# Patient Record
Sex: FEMALE | Race: OTHER | NOT HISPANIC OR LATINO
[De-identification: names, ages, dates, MRNs, and addresses within clinical notes are randomized per-mention and may not be internally consistent; named-entity substitution may affect disease eponyms.]

---

## 2019-05-06 PROBLEM — Z00.00 ENCOUNTER FOR PREVENTIVE HEALTH EXAMINATION: Status: ACTIVE | Noted: 2019-05-06

## 2019-05-21 ENCOUNTER — APPOINTMENT (OUTPATIENT)
Dept: ORTHOPEDIC SURGERY | Facility: CLINIC | Age: 74
End: 2019-05-21
Payer: MEDICARE

## 2019-05-21 DIAGNOSIS — Z96.653 PRESENCE OF ARTIFICIAL KNEE JOINT, BILATERAL: ICD-10-CM

## 2019-05-21 DIAGNOSIS — Z86.79 PERSONAL HISTORY OF OTHER DISEASES OF THE CIRCULATORY SYSTEM: ICD-10-CM

## 2019-05-21 DIAGNOSIS — M25.561 PAIN IN RIGHT KNEE: ICD-10-CM

## 2019-05-21 DIAGNOSIS — Z86.39 PERSONAL HISTORY OF OTHER ENDOCRINE, NUTRITIONAL AND METABOLIC DISEASE: ICD-10-CM

## 2019-05-21 PROCEDURE — 99204 OFFICE O/P NEW MOD 45 MIN: CPT

## 2019-05-21 PROCEDURE — 73562 X-RAY EXAM OF KNEE 3: CPT | Mod: 50

## 2019-06-03 VITALS — SYSTOLIC BLOOD PRESSURE: 153 MMHG | HEIGHT: 64 IN | DIASTOLIC BLOOD PRESSURE: 98 MMHG | HEART RATE: 66 BPM

## 2019-06-25 PROBLEM — Z86.79 HISTORY OF HYPERTENSION: Status: RESOLVED | Noted: 2019-06-25 | Resolved: 2019-06-25

## 2019-06-25 PROBLEM — Z86.39 HISTORY OF DIABETES MELLITUS: Status: RESOLVED | Noted: 2019-06-25 | Resolved: 2019-06-25

## 2019-06-25 NOTE — PHYSICAL EXAM
[General Appearance - Well-Appearing] : Well appearing [General Appearance - Well Nourished] : well nourished [Oriented To Time, Place, And Person] : Oriented to person, place, and time [Impaired Insight] : Insight and judgment were intact [Affect] : The affect was normal. [Mood] : the mood was normal [Sclera] : the sclera and conjunctiva were normal [Neck Cervical Mass (___cm)] : no neck mass was observed [Heart Rate And Rhythm] : heart rate was normal and rhythm regular [] : No respiratory distress [Abdomen Soft] : Soft [Normal Station and Gait] : gait and station were normal [Tenderness] : tenderness [Incision Healed - Describe:] : Incision is healed ~M [Full ROM Unless otherwise noted:] : Full range of motion unless otherwise noted: [LE  Motor Strength Normal unless otherwise noted:] : 5/5 strength in bilateral lower extemities unless otherwise noted. [Normal] : Sensation intact to light touch. [2+] : right 2+ [FreeTextEntry1] : On exam, her incision is clean dry and intact. She is stable to varus and valgus testing but no instability noted. She has no tenderness over the tibia or femur. His mild tenderness over the patella and medial lateral patella retinaculum. His lack of extension. She has no hypermobility of patella. It is well-seated and tracking. The LEFT he has no problems and has excellent range of motion. Overall range of motion both knees 0-125°. She does have some pain in her lower back going towards her RIGHT gluteal area. She has minimal if any gait changes. [Masses] : no masses [Skin Changes - Describe changes:] : No skin changes noted [Swelling] : no swelling

## 2019-06-25 NOTE — REVIEW OF SYSTEMS
[Joint Pain] : joint pain [Joint Stiffness] : joint stiffness [Nl] : Hematologic/Lymphatic [Feeling Tired] : not feeling tired [Fever] : no fever

## 2019-06-25 NOTE — DATA REVIEWED
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 There is a question of lucency around the RIGHT tibia however this did not look overtly loose. This is very symmetric and seem similar to prior from 4 or 5 years ago.

## 2019-06-25 NOTE — DISCUSSION/SUMMARY
[All Questions Answered] : Patient (and family) had all questions answered to an agreeable level of satisfaction [Interested in Proceeding] : Patient (and family) expressed understanding and interest in proceeding with the plan as outlined [de-identified] : I do not think the patient need any surgery right now.\par \par In physical therapy anti-inflammatories and a brace there have also given her a referral to the spine team. I will see her again in 6 months or as needed. We will keep an eye on the tibia to see if there is any progressive lucency or loosening. The meantime she is only having pain from the patella which may be coming from her back as well.\par \par If imaging was ordered, the patient was told to make an appointment to review findings right after all imaging is completed.\par \par We discussed risks, benefits and alternatives. Rationale of care was reviewed and all questions were answered. Patient (and family) had all questions answered to her degree of the level of satisfaction. Patient (and family) expressed understanding and interest in proceeding with the plan as outlined.\par \par \par \par \par This note was done with a voice recognition transcription software and any typos are related to this rather than medical error.

## 2019-06-25 NOTE — HISTORY OF PRESENT ILLNESS
[3] : currently ~his/her~ pain is 3 out of 10 [Intermit.] : ~He/She~ states the symptoms seem to be intermittent [Joint Movement] : worsened by joint movement [Walking] : worsened by walking [None] : No relieving factors are noted [FreeTextEntry1] : This is a 73-year-old female who I know from years ago. Her place of her knees back in 2012 and 2014. She reports having fallen approximately 8 months or a year ago onto the RIGHT knee and started having some anterior RIGHT knee pain. She also fell approximately one month ago twisting and also with considerable pain. Shows a significant lower back pain with occasional tightness in her glutes and hamstrings. She is able to walk and is not using assistive device.

## 2024-01-03 ENCOUNTER — OFFICE (OUTPATIENT)
Dept: URBAN - METROPOLITAN AREA CLINIC 76 | Facility: CLINIC | Age: 79
Setting detail: OPHTHALMOLOGY
End: 2024-01-03
Payer: MEDICARE

## 2024-01-03 DIAGNOSIS — H25.13: ICD-10-CM

## 2024-01-03 DIAGNOSIS — E11.9: ICD-10-CM

## 2024-01-03 DIAGNOSIS — H52.4: ICD-10-CM

## 2024-01-03 DIAGNOSIS — H35.373: ICD-10-CM

## 2024-01-03 PROCEDURE — 92134 CPTRZ OPH DX IMG PST SGM RTA: CPT | Performed by: OPTOMETRIST

## 2024-01-03 PROCEDURE — 92004 COMPRE OPH EXAM NEW PT 1/>: CPT | Performed by: OPTOMETRIST

## 2024-01-03 PROCEDURE — 92015 DETERMINE REFRACTIVE STATE: CPT | Performed by: OPTOMETRIST

## 2024-01-03 ASSESSMENT — SPHEQUIV_DERIVED
OD_SPHEQUIV: 0.25
OS_SPHEQUIV: 0.5
OD_SPHEQUIV: 0.25
OS_SPHEQUIV: 0.5

## 2024-01-03 ASSESSMENT — REFRACTION_AUTOREFRACTION
OD_AXIS: 108
OD_CYLINDER: -1.50
OS_CYLINDER: -2.00
OS_SPHERE: +1.50
OD_SPHERE: +1.00
OS_AXIS: 64

## 2024-01-03 ASSESSMENT — REFRACTION_MANIFEST
OD_AXIS: 108
OS_VA1: 20/20
OD_CYLINDER: -1.50
OS_SPHERE: +1.50
OS_CYLINDER: -2.00
OS_ADD: +2.75
OD_VA1: 20/20
OS_AXIS: 64
OD_SPHERE: +1.00
OD_ADD: +2.75

## 2024-01-03 ASSESSMENT — REFRACTION_CURRENTRX
OD_OVR_VA: 20/
OS_AXIS: 60
OD_AXIS: 108
OD_CYLINDER: -0.75
OD_ADD: +2.50
OS_CYLINDER: -2.00
OS_SPHERE: +1.50
OS_OVR_VA: 20/
OS_ADD: +2.50
OD_SPHERE: +1.00

## 2024-01-03 ASSESSMENT — CONFRONTATIONAL VISUAL FIELD TEST (CVF)
OS_FINDINGS: FULL
OD_FINDINGS: FULL

## 2024-04-19 ENCOUNTER — INPATIENT (INPATIENT)
Facility: HOSPITAL | Age: 79
LOS: 3 days | Discharge: HOME | DRG: 293 | End: 2024-04-23
Attending: STUDENT IN AN ORGANIZED HEALTH CARE EDUCATION/TRAINING PROGRAM | Admitting: STUDENT IN AN ORGANIZED HEALTH CARE EDUCATION/TRAINING PROGRAM
Payer: MEDICARE

## 2024-04-19 VITALS
TEMPERATURE: 98 F | HEIGHT: 61 IN | RESPIRATION RATE: 18 BRPM | SYSTOLIC BLOOD PRESSURE: 171 MMHG | WEIGHT: 171.08 LBS | HEART RATE: 103 BPM | DIASTOLIC BLOOD PRESSURE: 95 MMHG | OXYGEN SATURATION: 99 %

## 2024-04-19 DIAGNOSIS — I50.9 HEART FAILURE, UNSPECIFIED: ICD-10-CM

## 2024-04-19 LAB
ALBUMIN SERPL ELPH-MCNC: 4.2 G/DL — SIGNIFICANT CHANGE UP (ref 3.5–5.2)
ALP SERPL-CCNC: 84 U/L — SIGNIFICANT CHANGE UP (ref 30–115)
ALT FLD-CCNC: 66 U/L — HIGH (ref 0–41)
ANION GAP SERPL CALC-SCNC: 14 MMOL/L — SIGNIFICANT CHANGE UP (ref 7–14)
AST SERPL-CCNC: 34 U/L — SIGNIFICANT CHANGE UP (ref 0–41)
BASE EXCESS BLDV CALC-SCNC: 1.3 MMOL/L — SIGNIFICANT CHANGE UP (ref -2–3)
BASOPHILS # BLD AUTO: 0.06 K/UL — SIGNIFICANT CHANGE UP (ref 0–0.2)
BASOPHILS NFR BLD AUTO: 1 % — SIGNIFICANT CHANGE UP (ref 0–1)
BILIRUB SERPL-MCNC: 0.7 MG/DL — SIGNIFICANT CHANGE UP (ref 0.2–1.2)
BUN SERPL-MCNC: 22 MG/DL — HIGH (ref 10–20)
CA-I SERPL-SCNC: 1.25 MMOL/L — SIGNIFICANT CHANGE UP (ref 1.15–1.33)
CALCIUM SERPL-MCNC: 9.6 MG/DL — SIGNIFICANT CHANGE UP (ref 8.4–10.5)
CHLORIDE SERPL-SCNC: 106 MMOL/L — SIGNIFICANT CHANGE UP (ref 98–110)
CO2 SERPL-SCNC: 22 MMOL/L — SIGNIFICANT CHANGE UP (ref 17–32)
CREAT SERPL-MCNC: 0.9 MG/DL — SIGNIFICANT CHANGE UP (ref 0.7–1.5)
EGFR: 66 ML/MIN/1.73M2 — SIGNIFICANT CHANGE UP
EOSINOPHIL # BLD AUTO: 0.06 K/UL — SIGNIFICANT CHANGE UP (ref 0–0.7)
EOSINOPHIL NFR BLD AUTO: 1 % — SIGNIFICANT CHANGE UP (ref 0–8)
GAS PNL BLDV: 142 MMOL/L — SIGNIFICANT CHANGE UP (ref 136–145)
GAS PNL BLDV: SIGNIFICANT CHANGE UP
GLUCOSE BLDC GLUCOMTR-MCNC: 139 MG/DL — HIGH (ref 70–99)
GLUCOSE BLDC GLUCOMTR-MCNC: 173 MG/DL — HIGH (ref 70–99)
GLUCOSE SERPL-MCNC: 147 MG/DL — HIGH (ref 70–99)
HCO3 BLDV-SCNC: 28 MMOL/L — SIGNIFICANT CHANGE UP (ref 22–29)
HCT VFR BLD CALC: 32.8 % — LOW (ref 37–47)
HCT VFR BLDA CALC: 32 % — LOW (ref 34.5–46.5)
HGB BLD CALC-MCNC: 10.7 G/DL — LOW (ref 11.7–16.1)
HGB BLD-MCNC: 10.2 G/DL — LOW (ref 12–16)
IMM GRANULOCYTES NFR BLD AUTO: 0.3 % — SIGNIFICANT CHANGE UP (ref 0.1–0.3)
LACTATE BLDV-MCNC: 1.6 MMOL/L — SIGNIFICANT CHANGE UP (ref 0.5–2)
LYMPHOCYTES # BLD AUTO: 2.33 K/UL — SIGNIFICANT CHANGE UP (ref 1.2–3.4)
LYMPHOCYTES # BLD AUTO: 39.6 % — SIGNIFICANT CHANGE UP (ref 20.5–51.1)
MCHC RBC-ENTMCNC: 25.6 PG — LOW (ref 27–31)
MCHC RBC-ENTMCNC: 31.1 G/DL — LOW (ref 32–37)
MCV RBC AUTO: 82.4 FL — SIGNIFICANT CHANGE UP (ref 81–99)
MONOCYTES # BLD AUTO: 0.5 K/UL — SIGNIFICANT CHANGE UP (ref 0.1–0.6)
MONOCYTES NFR BLD AUTO: 8.5 % — SIGNIFICANT CHANGE UP (ref 1.7–9.3)
NEUTROPHILS # BLD AUTO: 2.92 K/UL — SIGNIFICANT CHANGE UP (ref 1.4–6.5)
NEUTROPHILS NFR BLD AUTO: 49.6 % — SIGNIFICANT CHANGE UP (ref 42.2–75.2)
NRBC # BLD: 0 /100 WBCS — SIGNIFICANT CHANGE UP (ref 0–0)
NT-PROBNP SERPL-SCNC: 7199 PG/ML — HIGH (ref 0–300)
PCO2 BLDV: 53 MMHG — HIGH (ref 39–42)
PH BLDV: 7.33 — SIGNIFICANT CHANGE UP (ref 7.32–7.43)
PLATELET # BLD AUTO: 310 K/UL — SIGNIFICANT CHANGE UP (ref 130–400)
PMV BLD: 11 FL — HIGH (ref 7.4–10.4)
PO2 BLDV: 31 MMHG — SIGNIFICANT CHANGE UP (ref 25–45)
POTASSIUM BLDV-SCNC: 4.3 MMOL/L — SIGNIFICANT CHANGE UP (ref 3.5–5.1)
POTASSIUM SERPL-MCNC: 4.7 MMOL/L — SIGNIFICANT CHANGE UP (ref 3.5–5)
POTASSIUM SERPL-SCNC: 4.7 MMOL/L — SIGNIFICANT CHANGE UP (ref 3.5–5)
PROT SERPL-MCNC: 7.1 G/DL — SIGNIFICANT CHANGE UP (ref 6–8)
RBC # BLD: 3.98 M/UL — LOW (ref 4.2–5.4)
RBC # FLD: 15.4 % — HIGH (ref 11.5–14.5)
SAO2 % BLDV: 40.9 % — LOW (ref 67–88)
SODIUM SERPL-SCNC: 142 MMOL/L — SIGNIFICANT CHANGE UP (ref 135–146)
T3 SERPL-MCNC: 120 NG/DL — SIGNIFICANT CHANGE UP (ref 80–200)
TROPONIN T, HIGH SENSITIVITY RESULT: 22 NG/L — HIGH (ref 6–13)
TROPONIN T, HIGH SENSITIVITY RESULT: 23 NG/L — HIGH (ref 6–13)
WBC # BLD: 5.89 K/UL — SIGNIFICANT CHANGE UP (ref 4.8–10.8)
WBC # FLD AUTO: 5.89 K/UL — SIGNIFICANT CHANGE UP (ref 4.8–10.8)

## 2024-04-19 PROCEDURE — 83540 ASSAY OF IRON: CPT

## 2024-04-19 PROCEDURE — 99222 1ST HOSP IP/OBS MODERATE 55: CPT

## 2024-04-19 PROCEDURE — 84439 ASSAY OF FREE THYROXINE: CPT

## 2024-04-19 PROCEDURE — 82728 ASSAY OF FERRITIN: CPT

## 2024-04-19 PROCEDURE — 84443 ASSAY THYROID STIM HORMONE: CPT

## 2024-04-19 PROCEDURE — 93010 ELECTROCARDIOGRAM REPORT: CPT

## 2024-04-19 PROCEDURE — 93306 TTE W/DOPPLER COMPLETE: CPT

## 2024-04-19 PROCEDURE — 99291 CRITICAL CARE FIRST HOUR: CPT

## 2024-04-19 PROCEDURE — 84484 ASSAY OF TROPONIN QUANT: CPT

## 2024-04-19 PROCEDURE — 71045 X-RAY EXAM CHEST 1 VIEW: CPT | Mod: 26

## 2024-04-19 PROCEDURE — 85025 COMPLETE CBC W/AUTO DIFF WBC: CPT

## 2024-04-19 PROCEDURE — 80061 LIPID PANEL: CPT

## 2024-04-19 PROCEDURE — 82962 GLUCOSE BLOOD TEST: CPT

## 2024-04-19 PROCEDURE — 84480 ASSAY TRIIODOTHYRONINE (T3): CPT

## 2024-04-19 PROCEDURE — 85027 COMPLETE CBC AUTOMATED: CPT

## 2024-04-19 PROCEDURE — 83036 HEMOGLOBIN GLYCOSYLATED A1C: CPT

## 2024-04-19 PROCEDURE — 83550 IRON BINDING TEST: CPT

## 2024-04-19 PROCEDURE — 83735 ASSAY OF MAGNESIUM: CPT

## 2024-04-19 PROCEDURE — 86803 HEPATITIS C AB TEST: CPT

## 2024-04-19 PROCEDURE — 80048 BASIC METABOLIC PNL TOTAL CA: CPT

## 2024-04-19 PROCEDURE — 36415 COLL VENOUS BLD VENIPUNCTURE: CPT

## 2024-04-19 PROCEDURE — 83880 ASSAY OF NATRIURETIC PEPTIDE: CPT

## 2024-04-19 PROCEDURE — 94660 CPAP INITIATION&MGMT: CPT

## 2024-04-19 RX ORDER — LOSARTAN POTASSIUM 100 MG/1
100 TABLET, FILM COATED ORAL DAILY
Refills: 0 | Status: DISCONTINUED | OUTPATIENT
Start: 2024-04-19 | End: 2024-04-19

## 2024-04-19 RX ORDER — CARVEDILOL PHOSPHATE 80 MG/1
3.12 CAPSULE, EXTENDED RELEASE ORAL EVERY 12 HOURS
Refills: 0 | Status: DISCONTINUED | OUTPATIENT
Start: 2024-04-19 | End: 2024-04-21

## 2024-04-19 RX ORDER — DEXTROSE 50 % IN WATER 50 %
25 SYRINGE (ML) INTRAVENOUS ONCE
Refills: 0 | Status: DISCONTINUED | OUTPATIENT
Start: 2024-04-19 | End: 2024-04-23

## 2024-04-19 RX ORDER — GLUCAGON INJECTION, SOLUTION 0.5 MG/.1ML
1 INJECTION, SOLUTION SUBCUTANEOUS ONCE
Refills: 0 | Status: DISCONTINUED | OUTPATIENT
Start: 2024-04-19 | End: 2024-04-23

## 2024-04-19 RX ORDER — INFLUENZA VIRUS VACCINE 15; 15; 15; 15 UG/.5ML; UG/.5ML; UG/.5ML; UG/.5ML
0.7 SUSPENSION INTRAMUSCULAR ONCE
Refills: 0 | Status: DISCONTINUED | OUTPATIENT
Start: 2024-04-19 | End: 2024-04-23

## 2024-04-19 RX ORDER — INSULIN LISPRO 100/ML
VIAL (ML) SUBCUTANEOUS
Refills: 0 | Status: DISCONTINUED | OUTPATIENT
Start: 2024-04-19 | End: 2024-04-23

## 2024-04-19 RX ORDER — FUROSEMIDE 40 MG
40 TABLET ORAL ONCE
Refills: 0 | Status: COMPLETED | OUTPATIENT
Start: 2024-04-19 | End: 2024-04-19

## 2024-04-19 RX ORDER — DEXTROSE 50 % IN WATER 50 %
15 SYRINGE (ML) INTRAVENOUS ONCE
Refills: 0 | Status: DISCONTINUED | OUTPATIENT
Start: 2024-04-19 | End: 2024-04-23

## 2024-04-19 RX ORDER — SODIUM CHLORIDE 9 MG/ML
1000 INJECTION, SOLUTION INTRAVENOUS
Refills: 0 | Status: DISCONTINUED | OUTPATIENT
Start: 2024-04-19 | End: 2024-04-23

## 2024-04-19 RX ORDER — DEXTROSE 50 % IN WATER 50 %
12.5 SYRINGE (ML) INTRAVENOUS ONCE
Refills: 0 | Status: DISCONTINUED | OUTPATIENT
Start: 2024-04-19 | End: 2024-04-23

## 2024-04-19 RX ORDER — INSULIN LISPRO 100/ML
VIAL (ML) SUBCUTANEOUS AT BEDTIME
Refills: 0 | Status: DISCONTINUED | OUTPATIENT
Start: 2024-04-19 | End: 2024-04-23

## 2024-04-19 RX ORDER — DAPAGLIFLOZIN 10 MG/1
10 TABLET, FILM COATED ORAL EVERY 24 HOURS
Refills: 0 | Status: DISCONTINUED | OUTPATIENT
Start: 2024-04-20 | End: 2024-04-23

## 2024-04-19 RX ORDER — FUROSEMIDE 40 MG
40 TABLET ORAL
Refills: 0 | Status: DISCONTINUED | OUTPATIENT
Start: 2024-04-19 | End: 2024-04-21

## 2024-04-19 RX ORDER — MAGNESIUM SULFATE 500 MG/ML
2 VIAL (ML) INJECTION ONCE
Refills: 0 | Status: COMPLETED | OUTPATIENT
Start: 2024-04-19 | End: 2024-04-19

## 2024-04-19 RX ORDER — ENOXAPARIN SODIUM 100 MG/ML
40 INJECTION SUBCUTANEOUS EVERY 24 HOURS
Refills: 0 | Status: DISCONTINUED | OUTPATIENT
Start: 2024-04-19 | End: 2024-04-23

## 2024-04-19 RX ORDER — METFORMIN HYDROCHLORIDE 850 MG/1
1 TABLET ORAL
Refills: 0 | DISCHARGE

## 2024-04-19 RX ORDER — SACUBITRIL AND VALSARTAN 24; 26 MG/1; MG/1
1 TABLET, FILM COATED ORAL
Refills: 0 | Status: DISCONTINUED | OUTPATIENT
Start: 2024-04-19 | End: 2024-04-23

## 2024-04-19 RX ORDER — DEXTROSE 10 % IN WATER 10 %
125 INTRAVENOUS SOLUTION INTRAVENOUS ONCE
Refills: 0 | Status: DISCONTINUED | OUTPATIENT
Start: 2024-04-19 | End: 2024-04-23

## 2024-04-19 RX ADMIN — CARVEDILOL PHOSPHATE 3.12 MILLIGRAM(S): 80 CAPSULE, EXTENDED RELEASE ORAL at 17:12

## 2024-04-19 RX ADMIN — Medication 40 MILLIGRAM(S): at 11:40

## 2024-04-19 RX ADMIN — Medication 0: at 21:30

## 2024-04-19 RX ADMIN — Medication 40 MILLIGRAM(S): at 17:12

## 2024-04-19 RX ADMIN — ENOXAPARIN SODIUM 40 MILLIGRAM(S): 100 INJECTION SUBCUTANEOUS at 17:12

## 2024-04-19 RX ADMIN — Medication 25 GRAM(S): at 11:40

## 2024-04-19 NOTE — H&P ADULT - NS ATTEND AMEND GEN_ALL_CORE FT
78yoF with HTN, DM, and LEIGH who presented with progressive SOB and edema. CXR with bilateral opacities. ECG with ST and LBBB. BNP 7199. Admitted for volume overload and started on Lasix 40 mg IV BID. No prior history of HF, echo pending.

## 2024-04-19 NOTE — H&P ADULT - ASSESSMENT
Assessment:  79 yo female with PMHx of HTN, DM, LEIGH (not on CPap) who presented to [facility] ED w/ complaints of SOB worsening over past couple of days, found to be volume overloaded, now admitted to cardiac telemetry for further management.     Problems discussed and associated plan:    #SOB/ Volume overload  PRO BnP 7199  -Admit to cardiac telemetry  -Monitor on tele  -ECG in AM  -Troponin 22, repeat pending  -f/u TTE   -f/u AM Labs (Iron total, TIBC, Ferritin, Lipid profile, A1C, TSH/FT4, BMP, CBC, magnesium  -s/p IV Lasix 40mg x 1  -will start Lasix 40mg IVP BID  -Strict I&Os, daily standing weight  -Monitor Lytes, keep K>4, Mg>2, replete as needed    #HTN   -monitor BP   -continue home Losartan 100mg QD  -continue home Coreg 3.125mg BID  -hold home HCTZ i/so IV diuresis     #DM  -f/u AM A1C  -on Metformin at home, will hold inpatient  -ISS/ Finger sticks     #LEIGH  not on cPAP at home  -was put on cpap in ED for comfort, will ween off on unit and keep on NC     Please contact me with any questions or concerns at [phone] Assessment:  79 yo female with PMHx of HTN, DM, LEIGH (not on CPap) who presented to [facility] ED w/ complaints of SOB worsening over past couple of days, found to be volume overloaded, now admitted to cardiac telemetry for further management.     Problems discussed and associated plan:    #SOB/ Volume overload  PRO BnP 7199  -Admit to cardiac telemetry  -Monitor on tele  -ECG in AM  -Troponin 22 -> 23, f/u AM trop  -f/u TTE   -f/u AM Labs (Iron total, TIBC, Ferritin, Lipid profile, A1C, TSH/FT4, BMP, CBC, magnesium  -s/p IV Lasix 40mg x 1  -will start Lasix 40mg IVP BID  -Strict I&Os, daily standing weight  -Monitor Lytes, keep K>4, Mg>2, replete as needed    #HTN   -monitor BP   -continue home Losartan 100mg QD  -continue home Coreg 3.125mg BID  -hold home HCTZ i/so IV diuresis     #DM  -f/u AM A1C  -on Metformin at home, will hold inpatient  -ISS/ Finger sticks     #LEIGH  not on cPAP at home  -was put on cpap in ED for comfort, will ween off on unit and keep on NC     Please contact me with any questions or concerns at [phone]

## 2024-04-19 NOTE — PATIENT PROFILE ADULT - FALL HARM RISK - RISK INTERVENTIONS

## 2024-04-19 NOTE — ED PROVIDER NOTE - PHYSICAL EXAMINATION
CONSTITUTIONAL: well-appearing, in NAD  SKIN: Warm dry, normal skin turgor  HEAD: NCAT  EYES: EOMI, PERRLA, no scleral icterus, conjunctiva pink  ENT: normal pharynx with no erythema or exudates  NECK: Supple; non tender. Full ROM.  CARD: RRR, no murmurs.  RESP: bilateral crackles at the bases  ABD: soft, non-tender, non-distended, no rebound or guarding.  EXT: Full ROM, no bony tenderness, 2+ pitting edema, no calf tenderness  NEURO: normal motor. normal sensory. CN II-XII intact. Cerebellar testing normal. Normal gait.  PSYCH: Cooperative, appropriate.

## 2024-04-19 NOTE — ED PROVIDER NOTE - OBJECTIVE STATEMENT
Patient is a 78 y.o female with PMHx of HTN, DM, LEIGH who presents for eval of SOB worsening over past couple of days. Otherwise denies any fever, chills, headache, changes in vision, cough, congestion, sob, n/v/d, abd pain, constipation, urinary complaints, lower extremity pain/swelling.

## 2024-04-19 NOTE — ED PROVIDER NOTE - CARE PLAN
Principal Discharge DX:	New onset of congestive heart failure  Secondary Diagnosis:	Shortness of breath   1

## 2024-04-19 NOTE — H&P ADULT - NSICDXPASTMEDICALHX_GEN_ALL_CORE_FT
PAST MEDICAL HISTORY:  DM (diabetes mellitus)     Hypertension     LEIGH (obstructive sleep apnea)

## 2024-04-19 NOTE — ED ADULT NURSE NOTE - OBJECTIVE STATEMENT
Pt reports to ed for chest pain & sob since yesterday. Pt told by cardiologist that she has fluid around her lungs.

## 2024-04-19 NOTE — ED ADULT NURSE NOTE - NSFALLUNIVINTERV_ED_ALL_ED
Bed/Stretcher in lowest position, wheels locked, appropriate side rails in place/Call bell, personal items and telephone in reach/Instruct patient to call for assistance before getting out of bed/chair/stretcher/Non-slip footwear applied when patient is off stretcher/[place] to call system/Physically safe environment - no spills, clutter or unnecessary equipment/Purposeful proactive rounding/Room/bathroom lighting operational, light cord in reach

## 2024-04-19 NOTE — ED PROVIDER NOTE - PROGRESS NOTE DETAILS
78-year-old female history of hypertension diabetes coming in here for worsening shortness of breath.  Being evaluated for CHF exacerbation at [place].  Coming here for worsening shortness of breath/chest pain.  Vital signs reviewed.  Patient in mild acute distress.  Tachypneic with expiratory wheezing.  Placed on BiPAP and Lasix given.  EKG chest x-ray done.  Consistent with fluid overload.  Patient reassessed feeling better.  Admission to telemetry.  Patient aware of findings.-Authored by Fior Wynn ED Attending KATHY Snider  cardio tele made aware of pt, report admission to cardio tele, I have fully discussed the medical management and delivery of care with the patient and daughter. I have discussed any available labs, imaging and treatment options.  Pt admitted for further care & management. 78-year-old female history of hypertension diabetes coming in here for worsening shortness of breath.  Being evaluated for CHF exacerbation at [place].  Coming here for worsening shortness of breath/chest pain.  Vital signs reviewed.  Patient in mild acute distress.  Tachypneic with expiratory wheezing.  Placed on BiPAP and Lasix given.  EKG chest x-ray done.  Consistent with fluid overload.  Patient reassessed feeling better.  Admission to telemetry. Patient aware of findings.-Authored by Fior Wynn

## 2024-04-19 NOTE — ED PROVIDER NOTE - CLINICAL SUMMARY MEDICAL DECISION MAKING FREE TEXT BOX
78-year-old female with past medical history of high blood pressure, diabetes, recently seen by pulmonary to rule out obstructive sleep apnea for shortness of breath she was experiencing, had CT chest done on March 27 which showed bilateral pleural effusions, was sent to cardiology for concern for congestive heart failure has not been able to get an appointment (sees physician in [place]), today went to [place] to get a CT scan of her back that was completed for chronic back pain after getting the CT scan patient had worsening shortness of breath that she has been having for 2 days, worse with exertion, associated with substernal chest tightness that started last night, constant, mild, nonradiating, no alleviating or precipitating factors, as well as nausea. No fever, chills, n/v, pleuritic cp,  alpitations, diaphoresis, cough, ha/lh/dizziness, numbness/tingling, neck pain/ stiffness, abd pain, diarrhea, constipation, melena/brbpr, urinary symptoms, trauma, weakness, calf pain/swelling/erythema, sick contacts, recent travel or rash.    On Exam:  Vital Signs: I have reviewed the initial vital signs. Constitutiona pt sitting on stretcher speaking full sentences but with increased WOB improved on BiPAP. Integumentary: No rash. ENT: MMM NECK: Supple, non-tender, no meningeal signs. Cardiovascular: RRR, radial pulses 2/4 b/l. No JVD. Respiratory: BS present b/l, crackles present b/l, poor air exchange, (+) accessory muscle use improved on BiPAP, no stridor. bedside us with b/l pleural effusions and b lines. Gastrointestinal: BS present throughout all 4 quadrants, soft, nd, nt, no rebound tenderness or guarding, no cvat. Musculoskeletal: FROM, b/l edema, no calf pain/swelling/erythema. Neurologic: AAOx3, motor 5/5 and sensation intact throughout upper and lowe ext, CN II-XII intact, No facial droop or slurring of speech. No focal deficits.    Plan: Monitor, BiPAP, EKG, CXR, labs, reassess.    Labs and EKG were ordered and reviewed.  Imaging was ordered and reviewed by me.  Appropriate medications for patient's presenting complaints were ordered and effects were reassessed. Additional history was obtained from daughter. 78-year-old female with past medical history of high blood pressure, diabetes, recently seen by pulmonary to rule out obstructive sleep apnea for shortness of breath she was experiencing, had CT chest done on March 27 which showed bilateral pleural effusions, was sent to cardiology for concern for congestive heart failure has not been able to get an appointment (sees physician in [place]), today went to [place] to get a CT scan of her back that was completed for chronic back pain after getting the CT scan patient had worsening shortness of breath that she has been having for 2 days, worse with exertion, associated with substernal chest tightness that started last night, constant, mild, nonradiating, no alleviating or precipitating factors, as well as nausea. No fever, chills, n/v, pleuritic cp,  alpitations, diaphoresis, cough, ha/lh/dizziness, numbness/tingling, neck pain/ stiffness, abd pain, diarrhea, constipation, melena/brbpr, urinary symptoms, trauma, weakness, calf pain/swelling/erythema, sick contacts, recent travel or rash.    On Exam:  Vital Signs: I have reviewed the initial vital signs. Constitutiona pt sitting on stretcher speaking full sentences but with increased WOB improved on BiPAP. Integumentary: No rash. ENT: MMM NECK: Supple, non-tender, no meningeal signs. Cardiovascular: RRR, radial pulses 2/4 b/l. No JVD. Respiratory: BS present b/l, crackles present b/l, poor air exchange, (+) accessory muscle use improved on BiPAP, no stridor. bedside us with b/l pleural effusions and b lines. Gastrointestinal: BS present throughout all 4 quadrants, soft, nd, nt, no rebound tenderness or guarding, no cvat. Musculoskeletal: FROM, b/l edema, no calf pain/swelling/erythema. Neurologic: AAOx3, motor 5/5 and sensation intact throughout upper and lowe ext, CN II-XII intact, No facial droop or slurring of speech. No focal deficits.    Plan: Monitor, BiPAP, EKG, CXR, labs, reassess.    Labs and EKG were ordered and reviewed.  Imaging was ordered and reviewed by me.  Appropriate medications for patient's presenting complaints were ordered and effects were reassessed. Additional history was obtained from daughter. Escalation to admission/observation was considered. Patient requires inpatient hospitalization - monitored setting.  cardio tele made aware of pt, report admission to cardio tele, I have fully discussed the medical management and delivery of care with the patient and daughter. I have discussed any available labs, imaging and treatment options.  Pt admitted for further care & management. 78-year-old female with past medical history of high blood pressure, diabetes, recently seen by pulmonary to rule out obstructive sleep apnea for shortness of breath she was experiencing, had CT chest done on March 27 which showed bilateral pleural effusions, was sent to cardiology for concern for congestive heart failure has not been able to get an appointment (sees physician in [place]), today went to [place] to get a CT scan of her back that was completed for chronic back pain after getting the CT scan patient had worsening shortness of breath that she has been having for 2 days, worse with exertion, associated with substernal chest tightness that started last night, constant, mild, nonradiating, no alleviating or precipitating factors, as well as nausea. No fever, chills, n/v, pleuritic cp,  palpitations, diaphoresis, cough, ha/lh/dizziness, numbness/tingling, neck pain/ stiffness, abd pain, diarrhea, constipation, melena/brbpr, urinary symptoms, trauma, weakness, calf pain/swelling/erythema, sick contacts, recent travel or rash.    On Exam:  Vital Signs: I have reviewed the initial vital signs. Constitutional: pt sitting on stretcher speaking full sentences but with increased WOB improved on BiPAP. Integumentary: No rash. ENT: MMM NECK: Supple, non-tender, no meningeal signs. Cardiovascular: RRR, radial pulses 2/4 b/l. No JVD. Respiratory: BS present b/l, crackles present b/l, poor air exchange, (+) accessory muscle use improved on BiPAP, no stridor. bedside us with b/l pleural effusions and b lines. Gastrointestinal: BS present throughout all 4 quadrants, soft, nd, nt, no rebound tenderness or guarding, no cvat. Musculoskeletal: FROM, b/l edema, no calf pain/swelling/erythema. Neurologic: AAOx3, motor 5/5 and sensation intact throughout upper and lowe ext, CN II-XII intact, No facial droop or slurring of speech. No focal deficits.    Plan: Monitor, BiPAP, EKG, CXR, labs, reassess.    Labs and EKG were ordered and reviewed.  Imaging was ordered and reviewed by me.  Appropriate medications for patient's presenting complaints were ordered and effects were reassessed. Additional history was obtained from daughter. Escalation to admission/observation was considered. Patient requires inpatient hospitalization - monitored setting.  cardio tele made aware of pt, report admission to cardio tele, I have fully discussed the medical management and delivery of care with the patient and daughter. I have discussed any available labs, imaging and treatment options.  Pt admitted for further care & management.

## 2024-04-19 NOTE — ED ADULT TRIAGE NOTE - CHIEF COMPLAINT QUOTE
as per daughter  told her to go to cardiologist for fluid around her lungs. yesterday she started having shortness of breath with chest pain

## 2024-04-19 NOTE — H&P ADULT - NSHPPHYSICALEXAM_GEN_ALL_CORE
General: No apparent distress, on bipap for comfort   HEENT: Anicteric sclera. Moist mucous membranes.   Cardiac: Regular rate and rhythm. No murmurs, rubs, or gallops.   Vascular: Symmetric radial pulses. Dorsalis pedis pulses palpable.   Respiratory: Normal effort. Bibasilar crackles.    Abdomen: Soft, nontender. Audible bowel sounds.   Extremities: Warm with 2+ pitting edema. No cyanosis or clubbing.   Skin: Warm and dry. No rash.   Neurologic: Grossly normal motor function.   Psychiatric: Euthymic. Oriented to person, place, and time.

## 2024-04-19 NOTE — ED PROVIDER NOTE - ATTENDING CONTRIBUTION TO CARE
78-year-old female with past medical history of high blood pressure, diabetes, recently seen by pulmonary to rule out obstructive sleep apnea for shortness of breath she was experiencing, had CT chest done on March 27 which showed bilateral pleural effusions, was sent to cardiology for concern for congestive heart failure has not been able to get an appointment (sees physician in [place]), today went to [place] to get a CT scan of her back that was completed for chronic back pain after getting the CT scan patient had worsening shortness of breath that she has been having for 2 days, worse with exertion, associated with substernal chest tightness that started last night, constant, mild, nonradiating, no alleviating or precipitating factors, as well as nausea. No fever, chills, n/v, pleuritic cp,  alpitations, diaphoresis, cough, ha/lh/dizziness, numbness/tingling, neck pain/ stiffness, abd pain, diarrhea, constipation, melena/brbpr, urinary symptoms, trauma, weakness, calf pain/swelling/erythema, sick contacts, recent travel or rash.    On Exam:  Vital Signs: I have reviewed the initial vital signs. Constitutiona pt sitting on stretcher speaking full sentences but with increased WOB improved on BiPAP. Integumentary: No rash. ENT: MMM NECK: Supple, non-tender, no meningeal signs. Cardiovascular: RRR, radial pulses 2/4 b/l. No JVD. Respiratory: BS present b/l, crackles present b/l, poor air exchange, (+) accessory muscle use improved on BiPAP, no stridor. bedside us with b/l pleural effusions and b lines. Gastrointestinal: BS present throughout all 4 quadrants, soft, nd, nt, no rebound tenderness or guarding, no cvat. Musculoskeletal: FROM, b/l edema, no calf pain/swelling/erythema. Neurologic: AAOx3, motor 5/5 and sensation intact throughout upper and lowe ext, CN II-XII intact, No facial droop or slurring of speech. No focal deficits.    Plan: Monitor, BiPAP, EKG, CXR, labs, reassess. 78-year-old female with past medical history of high blood pressure, diabetes, recently seen by pulmonary to rule out obstructive sleep apnea for shortness of breath she was experiencing, had CT chest done on March 27 which showed bilateral pleural effusions, was sent to cardiology for concern for congestive heart failure has not been able to get an appointment (sees physician in [place]), today went to [place] to get a CT scan of her back that was completed for chronic back pain after getting the CT scan patient had worsening shortness of breath that she has been having for 2 days, worse with exertion, associated with substernal chest tightness that started last night, constant, mild, nonradiating, no alleviating or precipitating factors, as well as nausea. No fever, chills, n/v, pleuritic cp,  palpitations, diaphoresis, cough, ha/lh/dizziness, numbness/tingling, neck pain/ stiffness, abd pain, diarrhea, constipation, melena/brbpr, urinary symptoms, trauma, weakness, calf pain/swelling/erythema, sick contacts, recent travel or rash.    On Exam:  Vital Signs: I have reviewed the initial vital signs. Constitutional: pt sitting on stretcher speaking full sentences but with increased WOB improved on BiPAP. Integumentary: No rash. ENT: MMM NECK: Supple, non-tender, no meningeal signs. Cardiovascular: RRR, radial pulses 2/4 b/l. No JVD. Respiratory: BS present b/l, crackles present b/l, poor air exchange, (+) accessory muscle use improved on BiPAP, no stridor. bedside us with b/l pleural effusions and b lines. Gastrointestinal: BS present throughout all 4 quadrants, soft, nd, nt, no rebound tenderness or guarding, no cvat. Musculoskeletal: FROM, b/l edema, no calf pain/swelling/erythema. Neurologic: AAOx3, motor 5/5 and sensation intact throughout upper and lowe ext, CN II-XII intact, No facial droop or slurring of speech. No focal deficits.    Plan: Monitor, BiPAP, EKG, CXR, labs, reassess.

## 2024-04-19 NOTE — H&P ADULT - NSHPLABSRESULTS_GEN_ALL_CORE
- ECG (4/19/2024):  Sinus Tachycardia 103 bpm, LBBB   - CXR (4/19/2024): PENDING     - Labs:                        10.2   5.89  )-----------( 310      ( 19 Apr 2024 11:50 )             32.8     04-19    142  |  106  |  22<H>  ----------------------------<  147<H>  4.7   |  22  |  0.9    Ca    9.6      19 Apr 2024 11:50    TPro  7.1  /  Alb  4.2  /  TBili  0.7  /  DBili  x   /  AST  34  /  ALT  66<H>  /  AlkPhos  84  04-19    LIVER FUNCTIONS - ( 19 Apr 2024 11:50 )  Alb: 4.2 g/dL / Pro: 7.1 g/dL / ALK PHOS: 84 U/L / ALT: 66 U/L / AST: 34 U/L / GGT: x           Lactate Trend    Urinalysis Basic - ( 19 Apr 2024 11:50 )    Color: x / Appearance: x / SG: x / pH: x  Gluc: 147 mg/dL / Ketone: x  / Bili: x / Urobili: x   Blood: x / Protein: x / Nitrite: x   Leuk Esterase: x / RBC: x / WBC x   Sq Epi: x / Non Sq Epi: x / Bacteria: x

## 2024-04-19 NOTE — H&P ADULT - HISTORY OF PRESENT ILLNESS
Mrs. Angeline Vasquez is 79 yo female with PMHx of HTN, DM, LEIGH (not on CPap) who presented to [facility] ED w/ complaints of SOB worsening over past couple of days. Patients daughter Shabnam at bedside at time of interview. Patient reports she has been progressively SOB, worsening over past 2 days, daughter states patient was initially able to walk around the house now reporting significant SOB with couple of steps. Patient endorses pleuritic chest pain on exertion but comfortable at rest. Patient was seen by a pulmonologist in [place] who endorsed patient has significant LEIGH, was recommended to start CPap but has not be able to get an appointment. Patient has a cardiologist in [place] whom she reports has not been able to get an appointment with. Patient endorses she sleeps with 3-4 pillows, and has noticed LE swelling since onset of symptoms. Daughter reports she is on HCTZ at home but endorses some med non-compliance as hydrochlorthiazide makes her feel dizzy. Patient endorses orthopnea and PND. Of note, patient denies active chest pain, syncope, dizziness, recent illness, sick contacts or recent travel.     In the ED patients VS notable for /95  RR 18 O2 99% on room air, pt transitioned to biPap for comfort while in stretcher.   CXR showed  EKG showed Sinus Tachycardia 103 bpm, LBBB   Labs notable for Hgb 10.2, ALT 66, BNP 7199, trop 22 -> trend pending  In the ED patient received Lasix 40mg IVP x 1 & Magnesium 2gm IVPB    Patient is now admitted to cardiac telemetry for further evaluation.  Mrs. Angeline Vasquez is 77 yo female with PMHx of HTN, DM, LEIGH (not on CPap) who presented to [facility] ED w/ complaints of SOB worsening over past couple of days. Patients daughter Shabnam at bedside at time of interview. Patient reports she has been progressively SOB, worsening over past 2 days, daughter states patient was initially able to walk around the house now reporting significant SOB with couple of steps. Patient endorses pleuritic chest pain on exertion but comfortable at rest. Patient was seen by a pulmonologist in [place] who endorsed patient has significant LEIGH, was recommended to start CPap but has not be able to get an appointment. Patient has a cardiologist in [place] whom she reports has not been able to get an appointment with. Patient endorses she sleeps with 3-4 pillows, and has noticed LE swelling since onset of symptoms. Daughter reports she is on HCTZ at home but endorses some med non-compliance as hydrochlorthiazide makes her feel dizzy. Patient endorses orthopnea and PND. Of note, patient denies active chest pain, syncope, dizziness, recent illness, sick contacts or recent travel.     In the ED patients VS notable for /95  RR 18 O2 99% on room air, pt transitioned to biPap for comfort while in stretcher.   CXR showed Enlarged cardiac silhouette, interstitial opacities, and trace effusions.  EKG showed Sinus Tachycardia 103 bpm, LBBB   Labs notable for Hgb 10.2, ALT 66, BNP 7199, trop 22 -> trend pending  In the ED patient received Lasix 40mg IVP x 1 & Magnesium 2gm IVPB    Patient is now admitted to cardiac telemetry for further management.  Mrs. Angeline Vasquez is 77 yo female with PMHx of HTN, DM, LEIGH (not on CPap) who presented to [facility] ED w/ complaints of SOB worsening over past couple of days. Patients daughter Shabnam at bedside at time of interview. Patient reports she has been progressively SOB, worsening over past 2 days, daughter states patient was initially able to walk around the house now reporting significant SOB with couple of steps. Patient endorses pleuritic chest pain on exertion but comfortable at rest. Patient was seen by a pulmonologist in [place] who endorsed patient has significant LEIGH, was recommended to start CPap but has not be able to get an appointment. Patient has a cardiologist in [place] whom she reports has not been able to get an appointment with. Patient endorses she sleeps with 3-4 pillows, and has noticed LE swelling since onset of symptoms. Daughter reports she is on HCTZ at home but endorses some med non-compliance as hydrochlorthiazide makes her feel dizzy. Patient endorses orthopnea and PND. Of note, patient denies active chest pain, syncope, dizziness, recent illness, sick contacts or recent travel.     In the ED patients VS notable for /95  RR 18 O2 99% on room air, pt transitioned to biPap for comfort while in stretcher.   CXR showed Enlarged cardiac silhouette, interstitial opacities, and trace effusions.  EKG showed Sinus Tachycardia 103 bpm, LBBB   Labs notable for Hgb 10.2, ALT 66, BNP 7199, trop 22 -> 23   In the ED patient received Lasix 40mg IVP x 1 & Magnesium 2gm IVPB    Patient is now admitted to cardiac telemetry for further management.  Mrs. Avani Vasquez is 79 yo female with PMHx of HTN, DM, LEIGH (not on CPap) who presented to [facility] ED w/ complaints of SOB worsening over past couple of days. Patients daughter Shabnam at bedside at time of interview. Patient reports she has been progressively SOB, worsening over past 2 days, daughter states patient was initially able to walk around the house now reporting significant SOB with couple of steps. Patient endorses pleuritic chest pain on exertion but comfortable at rest. Patient was seen by a pulmonologist in [place] who endorsed patient has significant LEIGH, was recommended to start CPap but has not be able to get an appointment. Patient has a cardiologist in [place] whom she reports has not been able to get an appointment with. Patient endorses she sleeps with 3-4 pillows, and has noticed LE swelling since onset of symptoms. Daughter reports she is on HCTZ at home but endorses some med non-compliance as hydrochlorthiazide makes her feel dizzy. Patient endorses orthopnea and PND. Of note, patient denies active chest pain, syncope, dizziness, recent illness, sick contacts or recent travel.     In the ED patients VS notable for /95  RR 18 O2 99% on room air, pt transitioned to biPap for comfort while in stretcher.   CXR showed Enlarged cardiac silhouette, interstitial opacities, and trace effusions.  EKG showed Sinus Tachycardia 103 bpm, LBBB   Labs notable for Hgb 10.2, ALT 66, BNP 7199, trop 22 -> 23   In the ED patient received Lasix 40mg IVP x 1 & Magnesium 2gm IVPB    Patient is now admitted to cardiac telemetry for further management.

## 2024-04-19 NOTE — PATIENT PROFILE ADULT - INTERNATIONAL TRAVEL
"Anesthesia Release from PACU Note    Patient: Dulce Boogie    Procedure(s) Performed: Procedure(s) (LRB):  COLONOSCOPY (N/A)    Anesthesia type: MAC    Post pain: Adequate analgesia    Post assessment: no apparent anesthetic complications, tolerated procedure well and no evidence of recall    Last Vitals:   Visit Vitals  /75 (BP Location: Left arm, Patient Position: Lying)   Pulse 71   Temp 36.1 °C (97 °F) (Temporal)   Resp 17   Ht 5' 5" (1.651 m)   Wt 95.2 kg (209 lb 14.1 oz)   LMP 06/21/2003   SpO2 97%   Breastfeeding? No   BMI 34.93 kg/m²       Post vital signs: stable    Level of consciousness: awake, alert  and oriented    Nausea/Vomiting: no nausea/no vomiting    Complications: none    Airway Patency: patent    Respiratory: unassisted, spontaneous ventilation, room air    Cardiovascular: stable and blood pressure at baseline    Hydration: euvolemic  " No

## 2024-04-20 ENCOUNTER — RESULT REVIEW (OUTPATIENT)
Age: 79
End: 2024-04-20

## 2024-04-20 LAB
A1C WITH ESTIMATED AVERAGE GLUCOSE RESULT: 6.7 % — HIGH (ref 4–5.6)
ANION GAP SERPL CALC-SCNC: 12 MMOL/L — SIGNIFICANT CHANGE UP (ref 7–14)
BUN SERPL-MCNC: 23 MG/DL — HIGH (ref 10–20)
CALCIUM SERPL-MCNC: 9.5 MG/DL — SIGNIFICANT CHANGE UP (ref 8.4–10.5)
CHLORIDE SERPL-SCNC: 104 MMOL/L — SIGNIFICANT CHANGE UP (ref 98–110)
CHOLEST SERPL-MCNC: 198 MG/DL — SIGNIFICANT CHANGE UP
CO2 SERPL-SCNC: 30 MMOL/L — SIGNIFICANT CHANGE UP (ref 17–32)
CREAT SERPL-MCNC: 1.2 MG/DL — SIGNIFICANT CHANGE UP (ref 0.7–1.5)
EGFR: 47 ML/MIN/1.73M2 — LOW
ESTIMATED AVERAGE GLUCOSE: 146 MG/DL — HIGH (ref 68–114)
FERRITIN SERPL-MCNC: 42 NG/ML — SIGNIFICANT CHANGE UP (ref 13–330)
GLUCOSE BLDC GLUCOMTR-MCNC: 118 MG/DL — HIGH (ref 70–99)
GLUCOSE BLDC GLUCOMTR-MCNC: 133 MG/DL — HIGH (ref 70–99)
GLUCOSE BLDC GLUCOMTR-MCNC: 143 MG/DL — HIGH (ref 70–99)
GLUCOSE BLDC GLUCOMTR-MCNC: 156 MG/DL — HIGH (ref 70–99)
GLUCOSE SERPL-MCNC: 119 MG/DL — HIGH (ref 70–99)
HCT VFR BLD CALC: 31.5 % — LOW (ref 37–47)
HCV AB S/CO SERPL IA: 0.06 COI — SIGNIFICANT CHANGE UP
HCV AB SERPL-IMP: SIGNIFICANT CHANGE UP
HDLC SERPL-MCNC: 75 MG/DL — SIGNIFICANT CHANGE UP
HGB BLD-MCNC: 9.8 G/DL — LOW (ref 12–16)
IRON SATN MFR SERPL: 14 % — LOW (ref 15–50)
IRON SATN MFR SERPL: 36 UG/DL — SIGNIFICANT CHANGE UP (ref 35–150)
LIPID PNL WITH DIRECT LDL SERPL: 108 MG/DL — HIGH
MAGNESIUM SERPL-MCNC: 1.8 MG/DL — SIGNIFICANT CHANGE UP (ref 1.8–2.4)
MCHC RBC-ENTMCNC: 25.5 PG — LOW (ref 27–31)
MCHC RBC-ENTMCNC: 31.1 G/DL — LOW (ref 32–37)
MCV RBC AUTO: 81.8 FL — SIGNIFICANT CHANGE UP (ref 81–99)
NON HDL CHOLESTEROL: 123 MG/DL — SIGNIFICANT CHANGE UP
NRBC # BLD: 0 /100 WBCS — SIGNIFICANT CHANGE UP (ref 0–0)
PLATELET # BLD AUTO: 309 K/UL — SIGNIFICANT CHANGE UP (ref 130–400)
PMV BLD: 11.3 FL — HIGH (ref 7.4–10.4)
POTASSIUM SERPL-MCNC: 4.4 MMOL/L — SIGNIFICANT CHANGE UP (ref 3.5–5)
POTASSIUM SERPL-SCNC: 4.4 MMOL/L — SIGNIFICANT CHANGE UP (ref 3.5–5)
RBC # BLD: 3.85 M/UL — LOW (ref 4.2–5.4)
RBC # FLD: 15.2 % — HIGH (ref 11.5–14.5)
SODIUM SERPL-SCNC: 146 MMOL/L — SIGNIFICANT CHANGE UP (ref 135–146)
T4 FREE SERPL-MCNC: 1.4 NG/DL — SIGNIFICANT CHANGE UP (ref 0.9–1.8)
TIBC SERPL-MCNC: 249 UG/DL — SIGNIFICANT CHANGE UP (ref 220–430)
TRIGL SERPL-MCNC: 76 MG/DL — SIGNIFICANT CHANGE UP
TSH SERPL-MCNC: 2.18 UIU/ML — SIGNIFICANT CHANGE UP (ref 0.27–4.2)
UIBC SERPL-MCNC: 213 UG/DL — SIGNIFICANT CHANGE UP (ref 110–370)
WBC # BLD: 5.48 K/UL — SIGNIFICANT CHANGE UP (ref 4.8–10.8)
WBC # FLD AUTO: 5.48 K/UL — SIGNIFICANT CHANGE UP (ref 4.8–10.8)

## 2024-04-20 PROCEDURE — 99233 SBSQ HOSP IP/OBS HIGH 50: CPT

## 2024-04-20 RX ORDER — IRON SUCROSE 20 MG/ML
200 INJECTION, SOLUTION INTRAVENOUS EVERY 24 HOURS
Refills: 0 | Status: DISCONTINUED | OUTPATIENT
Start: 2024-04-20 | End: 2024-04-23

## 2024-04-20 RX ADMIN — Medication 0: at 21:43

## 2024-04-20 RX ADMIN — ENOXAPARIN SODIUM 40 MILLIGRAM(S): 100 INJECTION SUBCUTANEOUS at 17:17

## 2024-04-20 RX ADMIN — SACUBITRIL AND VALSARTAN 1 TABLET(S): 24; 26 TABLET, FILM COATED ORAL at 05:33

## 2024-04-20 RX ADMIN — DAPAGLIFLOZIN 10 MILLIGRAM(S): 10 TABLET, FILM COATED ORAL at 05:32

## 2024-04-20 RX ADMIN — CARVEDILOL PHOSPHATE 3.12 MILLIGRAM(S): 80 CAPSULE, EXTENDED RELEASE ORAL at 17:17

## 2024-04-20 RX ADMIN — SACUBITRIL AND VALSARTAN 1 TABLET(S): 24; 26 TABLET, FILM COATED ORAL at 17:17

## 2024-04-20 RX ADMIN — CARVEDILOL PHOSPHATE 3.12 MILLIGRAM(S): 80 CAPSULE, EXTENDED RELEASE ORAL at 05:33

## 2024-04-20 RX ADMIN — Medication 1: at 17:16

## 2024-04-20 RX ADMIN — Medication 40 MILLIGRAM(S): at 05:32

## 2024-04-20 RX ADMIN — Medication 40 MILLIGRAM(S): at 13:23

## 2024-04-20 NOTE — PROGRESS NOTE ADULT - ASSESSMENT
Assessment:  79 yo female with PMHx of HTN, DM, LEIGH (not on CPap) who presented to [facility] ED w/ complaints of SOB worsening over past couple of days, found to be volume overloaded, now admitted to cardiac telemetry for further management.     Problems discussed and associated plan:    #SOB/ Volume overload  PRO BnP 7199  -Monitor on tele  -Troponin 22 -> 23  -f/u TTE   -Lipid   HDL 75 TRIGL 78  -A1C 6.7%  -c/w  Lasix 40mg IVP BID (UOP: 2.2L)  -Strict I&Os, daily standing weight  -Monitor Lytes, keep K>4, Mg>2, replete as needed    #HTN   -monitor BP   -continue home Losartan 100mg QD  -continue home Coreg 3.125mg BID  -hold home HCTZ i/so IV diuresis     #DM  -f/u AM A1C  -on Metformin at home, will hold inpatient  -ISS/ Finger sticks     #LEIGH  not on cPAP at home  -was put on cpap in ED for comfort, will ween off on unit and keep on NC     Please contact me with any questions or concerns at [phone]   Assessment:  79 yo female with PMHx of HTN, DM, LEIGH (not on CPap) who presented to [facility] ED w/ complaints of SOB worsening over past couple of days, found to be volume overloaded, now admitted to cardiac telemetry for further management.     Problems discussed and associated plan:    #SOB/ Volume overload  PRO BnP 7199  -Monitor on tele  -Troponin 22 -> 23  -f/u TTE   -Lipid   HDL 75 TRIGL 78  -A1C 6.7%  -c/w  Lasix 40mg IVP BID (UOP: 2.2L)  -Strict I&Os, daily standing weight  -Monitor Lytes, keep K>4, Mg>2, replete as needed  -start [id number]/51mg and Farxiga 10mg, (pt told she has weak heart outpatient)    #HTN   -monitor BP   -discontinue home Losartan 100mg QD started Entresto 49/51mg  -continue home Coreg 3.125mg BID  -hold home HCTZ i/so IV diuresis     #DM  -f/u AM A1C  -on Metformin at home, will hold inpatient  -ISS/ Finger sticks     #LEIGH  not on cPAP at home  -was put on cpap in ED for comfort, will ween off on unit and keep on NC     Please contact me with any questions or concerns at [phone]

## 2024-04-20 NOTE — PROGRESS NOTE ADULT - SUBJECTIVE AND OBJECTIVE BOX
Chief complaint: Patient is a 78y old  Female who presents with a chief complaint of Shortness of Breath (19 Apr 2024 13:18)    Interval history: Patient was seen and evaluated at bedside this AM. NAD. No overnight events. Patient reports significant improvement in SOB, states ambulating without issues. On NC for comfort while supine given LEIGH.     Review of systems: A complete 10-point review of systems was obtained and is negative except as stated in the interval history.    Vitals:  T(F): 97.6, Max: 98.7 (04-19 @ 23:27)  HR: 100 (72 - 100)  BP: 125/71 (125/71 - 167/94)  RR: 19 (18 - 20)  SpO2: 96% (89% - 100%)    Ins & outs:     04-19 @ 07:01  -  04-20 @ 07:00  --------------------------------------------------------  IN: 360 mL / OUT: 2200 mL / NET: -1840 mL      Weight trend:  Weight (kg): 77.6 (04-19)    Physical exam:  General: No apparent distress  HEENT: Anicteric sclera. Moist mucous membranes.  Cardiac: Regular rate and rhythm. PAPITO LUSB, RUSB  Vascular: Symmetric radial pulses. Dorsalis pedis pulses palpable.   Respiratory: Normal effort. Bibasilar crackles.   Abdomen: Soft, nontender. Audible bowel sounds.   Extremities: Warm with 1+ edema. No cyanosis or clubbing.   Skin: Warm and dry. No rash.   Neurologic: Grossly normal motor function.   Psychiatric: Oriented to person, place, and time.     Data reviewed:  - Telemetry: SR 98 ()  - ECG (4/19/24):  Sinus tachycardia, LBBB  - TTE: pending    - Chest x-ray (4/19/24): Enlarged cardiac silhouette, interstitial opacities, and trace effusions.      - Labs:                        9.8    5.48  )-----------( 309      ( 20 Apr 2024 06:30 )             31.5     04-20    146  |  104  |  23<H>  ----------------------------<  119<H>  4.4   |  30  |  1.2    Ca    9.5      20 Apr 2024 06:30  Mg     1.8     04-20    TPro  7.1  /  Alb  4.2  /  TBili  0.7  /  DBili  x   /  AST  34  /  ALT  66<H>  /  AlkPhos  84  04-19      Triglycerides, Serum: 76 mg/dL (04-20-24 @ 06:30)  LDL Cholesterol Calculated: 108 mg/dL (04-20-24 @ 06:30)    Thyroid Stimulating Hormone, Serum: 2.18 uIU/mL (04-20-24 @ 06:30)    Urinalysis Basic - ( 20 Apr 2024 06:30 )    Color: x / Appearance: x / SG: x / pH: x  Gluc: 119 mg/dL / Ketone: x  / Bili: x / Urobili: x   Blood: x / Protein: x / Nitrite: x   Leuk Esterase: x / RBC: x / WBC x   Sq Epi: x / Non Sq Epi: x / Bacteria: x    Medications:  carvedilol 3.125 milliGRAM(s) Oral every 12 hours  dapagliflozin 10 milliGRAM(s) Oral every 24 hours  dextrose 10% Bolus 125 milliLiter(s) IV Bolus once  dextrose 50% Injectable 25 Gram(s) IV Push once  dextrose 50% Injectable 12.5 Gram(s) IV Push once  enoxaparin Injectable 40 milliGRAM(s) SubCutaneous every 24 hours  furosemide   Injectable 40 milliGRAM(s) IV Push two times a day  glucagon  Injectable 1 milliGRAM(s) IntraMuscular once  influenza  Vaccine (HIGH DOSE) 0.7 milliLiter(s) IntraMuscular once  insulin lispro (ADMELOG) corrective regimen sliding scale   SubCutaneous three times a day before meals  insulin lispro (ADMELOG) corrective regimen sliding scale   SubCutaneous at bedtime  sacubitril 49 mG/valsartan 51 mG 1 Tablet(s) Oral two times a day    Drips:  dextrose 5%. 1000 milliLiter(s) (100 mL/Hr) IV Continuous <Continuous>  dextrose 5%. 1000 milliLiter(s) (50 mL/Hr) IV Continuous <Continuous>    PRN:     Allergies    No Known Allergies    Intolerances

## 2024-04-21 LAB
ANION GAP SERPL CALC-SCNC: 13 MMOL/L — SIGNIFICANT CHANGE UP (ref 7–14)
BUN SERPL-MCNC: 28 MG/DL — HIGH (ref 10–20)
CALCIUM SERPL-MCNC: 9.2 MG/DL — SIGNIFICANT CHANGE UP (ref 8.4–10.5)
CHLORIDE SERPL-SCNC: 101 MMOL/L — SIGNIFICANT CHANGE UP (ref 98–110)
CO2 SERPL-SCNC: 31 MMOL/L — SIGNIFICANT CHANGE UP (ref 17–32)
CREAT SERPL-MCNC: 1.3 MG/DL — SIGNIFICANT CHANGE UP (ref 0.7–1.5)
EGFR: 43 ML/MIN/1.73M2 — LOW
GLUCOSE BLDC GLUCOMTR-MCNC: 130 MG/DL — HIGH (ref 70–99)
GLUCOSE BLDC GLUCOMTR-MCNC: 141 MG/DL — HIGH (ref 70–99)
GLUCOSE BLDC GLUCOMTR-MCNC: 207 MG/DL — HIGH (ref 70–99)
GLUCOSE BLDC GLUCOMTR-MCNC: 239 MG/DL — HIGH (ref 70–99)
GLUCOSE SERPL-MCNC: 119 MG/DL — HIGH (ref 70–99)
HCT VFR BLD CALC: 34.5 % — LOW (ref 37–47)
HGB BLD-MCNC: 11.1 G/DL — LOW (ref 12–16)
MAGNESIUM SERPL-MCNC: 1.8 MG/DL — SIGNIFICANT CHANGE UP (ref 1.8–2.4)
MCHC RBC-ENTMCNC: 25.6 PG — LOW (ref 27–31)
MCHC RBC-ENTMCNC: 32.2 G/DL — SIGNIFICANT CHANGE UP (ref 32–37)
MCV RBC AUTO: 79.5 FL — LOW (ref 81–99)
NRBC # BLD: 0 /100 WBCS — SIGNIFICANT CHANGE UP (ref 0–0)
PLATELET # BLD AUTO: 318 K/UL — SIGNIFICANT CHANGE UP (ref 130–400)
PMV BLD: 10.7 FL — HIGH (ref 7.4–10.4)
POTASSIUM SERPL-MCNC: 3.7 MMOL/L — SIGNIFICANT CHANGE UP (ref 3.5–5)
POTASSIUM SERPL-SCNC: 3.7 MMOL/L — SIGNIFICANT CHANGE UP (ref 3.5–5)
RBC # BLD: 4.34 M/UL — SIGNIFICANT CHANGE UP (ref 4.2–5.4)
RBC # FLD: 15.1 % — HIGH (ref 11.5–14.5)
SODIUM SERPL-SCNC: 145 MMOL/L — SIGNIFICANT CHANGE UP (ref 135–146)
WBC # BLD: 5.72 K/UL — SIGNIFICANT CHANGE UP (ref 4.8–10.8)
WBC # FLD AUTO: 5.72 K/UL — SIGNIFICANT CHANGE UP (ref 4.8–10.8)

## 2024-04-21 PROCEDURE — 99223 1ST HOSP IP/OBS HIGH 75: CPT

## 2024-04-21 PROCEDURE — 99233 SBSQ HOSP IP/OBS HIGH 50: CPT

## 2024-04-21 PROCEDURE — 93306 TTE W/DOPPLER COMPLETE: CPT | Mod: 26

## 2024-04-21 RX ORDER — POTASSIUM CHLORIDE 20 MEQ
40 PACKET (EA) ORAL ONCE
Refills: 0 | Status: COMPLETED | OUTPATIENT
Start: 2024-04-21 | End: 2024-04-21

## 2024-04-21 RX ORDER — MAGNESIUM SULFATE 500 MG/ML
1 VIAL (ML) INJECTION ONCE
Refills: 0 | Status: COMPLETED | OUTPATIENT
Start: 2024-04-21 | End: 2024-04-21

## 2024-04-21 RX ORDER — CARVEDILOL PHOSPHATE 80 MG/1
6.25 CAPSULE, EXTENDED RELEASE ORAL EVERY 12 HOURS
Refills: 0 | Status: DISCONTINUED | OUTPATIENT
Start: 2024-04-21 | End: 2024-04-23

## 2024-04-21 RX ORDER — CARVEDILOL PHOSPHATE 80 MG/1
3.12 CAPSULE, EXTENDED RELEASE ORAL ONCE
Refills: 0 | Status: COMPLETED | OUTPATIENT
Start: 2024-04-21 | End: 2024-04-21

## 2024-04-21 RX ADMIN — IRON SUCROSE 110 MILLIGRAM(S): 20 INJECTION, SOLUTION INTRAVENOUS at 05:35

## 2024-04-21 RX ADMIN — SACUBITRIL AND VALSARTAN 1 TABLET(S): 24; 26 TABLET, FILM COATED ORAL at 05:20

## 2024-04-21 RX ADMIN — CARVEDILOL PHOSPHATE 3.12 MILLIGRAM(S): 80 CAPSULE, EXTENDED RELEASE ORAL at 10:56

## 2024-04-21 RX ADMIN — Medication 40 MILLIEQUIVALENT(S): at 08:50

## 2024-04-21 RX ADMIN — Medication 2: at 11:55

## 2024-04-21 RX ADMIN — CARVEDILOL PHOSPHATE 3.12 MILLIGRAM(S): 80 CAPSULE, EXTENDED RELEASE ORAL at 05:20

## 2024-04-21 RX ADMIN — Medication 100 GRAM(S): at 08:51

## 2024-04-21 RX ADMIN — Medication 40 MILLIGRAM(S): at 05:20

## 2024-04-21 RX ADMIN — SACUBITRIL AND VALSARTAN 1 TABLET(S): 24; 26 TABLET, FILM COATED ORAL at 17:25

## 2024-04-21 RX ADMIN — ENOXAPARIN SODIUM 40 MILLIGRAM(S): 100 INJECTION SUBCUTANEOUS at 17:26

## 2024-04-21 RX ADMIN — CARVEDILOL PHOSPHATE 6.25 MILLIGRAM(S): 80 CAPSULE, EXTENDED RELEASE ORAL at 17:25

## 2024-04-21 RX ADMIN — Medication 0: at 21:14

## 2024-04-21 RX ADMIN — DAPAGLIFLOZIN 10 MILLIGRAM(S): 10 TABLET, FILM COATED ORAL at 05:20

## 2024-04-21 NOTE — PROGRESS NOTE ADULT - SUBJECTIVE AND OBJECTIVE BOX
Chief complaint: Patient is a 78y old  Female who presents with a chief complaint of Shortness of Breath (19 Apr 2024 13:18)    Interval history: Patient was seen and evaluated at bedside this AM. NAD. No overnight events. Patient continues to feel improvement in SOB   Tele HR 60-90's  Sats 100% on RA  sitting up comfortable on chair talking on phone     Review of systems: A complete 10-point review of systems was obtained and is negative except as stated in the interval history.    Vitals:  Vital Signs Last 24 Hrs  T(C): 36.7 (21 Apr 2024 07:29), Max: 37.6 (20 Apr 2024 15:35)  T(F): 98 (21 Apr 2024 07:29), Max: 99.6 (20 Apr 2024 15:35)  HR: 86 (21 Apr 2024 07:29) (85 - 92)  BP: 116/74 (21 Apr 2024 07:29) (116/74 - 135/76)  BP(mean): 90 (21 Apr 2024 07:29) (90 - 99)  RR: 18 (21 Apr 2024 04:11) (18 - 19)  SpO2: 100% (21 Apr 2024 04:11) (97% - 100%)      Ins & outs:     04-19 @ 07:01  -  04-20 @ 07:00  --------------------------------------------------------  IN: 360 mL / OUT: 2200 mL / NET: -1840 mL    I&O's Summary    20 Apr 2024 07:01  -  21 Apr 2024 07:00  --------------------------------------------------------  IN: 240 mL / OUT: 3400 mL / NET: -3160 mL    21 Apr 2024 07:01  -  21 Apr 2024 11:25  --------------------------------------------------------  IN: 220 mL / OUT: 0 mL / NET: 220 mL          Weight trend:  Weight (kg): 77.6 (04-19)    Physical exam:  General: No apparent distress  HEENT: Anicteric sclera. Moist mucous membranes.  Cardiac: Regular rate and rhythm. PAPITO LUSB, RUSB  Vascular: Symmetric radial pulses. Dorsalis pedis pulses palpable.   Respiratory: Normal effort. mild bibasilar crackles   Abdomen: Soft, nontender. Audible bowel sounds.   Extremities: Warm with trace to 1+ edema. No cyanosis or clubbing.   Skin: Warm and dry. No rash.   Neurologic: Grossly normal motor function.   Psychiatric: Oriented to person, place, and time.     Data reviewed:  - Telemetry: SR 60-90's  - ECG (4/19/24):  Sinus tachycardia, LBBB  - TTE: < from: TTE Echo Complete w/o Contrast w/ Doppler (04.21.24 @ 01:28) >  Summary:   1. Left ventricular ejection fraction, by visual estimation, is 35 to   40%.   2. Moderately decreased global left ventricular systolic function.   3. Multiple left ventricular regional wall motion abnormalities exist.   See wall motion findings.   4. Spectral Doppler shows impaired relaxation pattern of left   ventricular myocardial filling (Grade I diastolic dysfunction).   5. Mildly enlarged left atrium.   6. Calcified aortic valve with mildly decreased opening.   7. Mild-to-moderate mitral regurgitation.   8. Mild tricuspid regurgitation.   9. Estimated pulmonary artery systolic pressure is 35.5 mmHg assuming a   right atrial pressure of 8 mmHg, which is consistent with borderline   pulmonary hypertension.  10. Trivial pericardial effusion.    < end of copied text >    - Chest x-ray (4/19/24): Enlarged cardiac silhouette, interstitial opacities, and trace effusions.          - Labs:                        11.1   5.72  )-----------( 318      ( 21 Apr 2024 05:18 )             34.5     04-21    145  |  101  |  28<H>  ----------------------------<  119<H>  3.7   |  31  |  1.3    Ca    9.2      21 Apr 2024 05:18  Mg     1.8     04-21    TPro  7.1  /  Alb  4.2  /  TBili  0.7  /  DBili  x   /  AST  34  /  ALT  66<H>  /  AlkPhos  84  04-19    LIVER FUNCTIONS - ( 19 Apr 2024 11:50 )  Alb: 4.2 g/dL / Pro: 7.1 g/dL / ALK PHOS: 84 U/L / ALT: 66 U/L / AST: 34 U/L / GGT: x                     Lactate Trend    Urinalysis Basic - ( 21 Apr 2024 05:18 )    Color: x / Appearance: x / SG: x / pH: x  Gluc: 119 mg/dL / Ketone: x  / Bili: x / Urobili: x   Blood: x / Protein: x / Nitrite: x   Leuk Esterase: x / RBC: x / WBC x   Sq Epi: x / Non Sq Epi: x / Bacteria: x        Triglycerides, Serum: 76 mg/dL (04-20-24 @ 06:30)  LDL Cholesterol Calculated: 108 mg/dL (04-20-24 @ 06:30)    Thyroid Stimulating Hormone, Serum: 2.18 uIU/mL (04-20-24 @ 06:30)    Urinalysis Basic - ( 20 Apr 2024 06:30 )    Color: x / Appearance: x / SG: x / pH: x  Gluc: 119 mg/dL / Ketone: x  / Bili: x / Urobili: x   Blood: x / Protein: x / Nitrite: x   Leuk Esterase: x / RBC: x / WBC x   Sq Epi: x / Non Sq Epi: x / Bacteria: x    Medications:  MEDICATIONS  (STANDING):  carvedilol 6.25 milliGRAM(s) Oral every 12 hours  dapagliflozin 10 milliGRAM(s) Oral every 24 hours  dextrose 10% Bolus 125 milliLiter(s) IV Bolus once  dextrose 5%. 1000 milliLiter(s) (100 mL/Hr) IV Continuous <Continuous>  dextrose 5%. 1000 milliLiter(s) (50 mL/Hr) IV Continuous <Continuous>  dextrose 50% Injectable 12.5 Gram(s) IV Push once  dextrose 50% Injectable 25 Gram(s) IV Push once  enoxaparin Injectable 40 milliGRAM(s) SubCutaneous every 24 hours  glucagon  Injectable 1 milliGRAM(s) IntraMuscular once  influenza  Vaccine (HIGH DOSE) 0.7 milliLiter(s) IntraMuscular once  insulin lispro (ADMELOG) corrective regimen sliding scale   SubCutaneous three times a day before meals  insulin lispro (ADMELOG) corrective regimen sliding scale   SubCutaneous at bedtime  iron sucrose IVPB 200 milliGRAM(s) IV Intermittent every 24 hours  sacubitril 49 mG/valsartan 51 mG 1 Tablet(s) Oral two times a day    MEDICATIONS  (PRN):  dextrose Oral Gel 15 Gram(s) Oral once PRN Blood Glucose LESS THAN 70 milliGRAM(s)/deciliter      PRN:     Allergies    No Known Allergies    Intolerances

## 2024-04-21 NOTE — PROGRESS NOTE ADULT - ASSESSMENT
Assessment:  79 yo female with PMHx of HTN, DM, LEIGH (not on CPap) who presented to [facility] ED w/ complaints of SOB worsening over past couple of days, found to be volume overloaded, now admitted to cardiac telemetry for further management.     Problems discussed and associated plan:    Assessment:  79 yo female with PMHx of HTN, DM, LEIGH (not on CPap) who presented to [facility] ED w/ complaints of SOB worsening over past couple of days, found to be volume overloaded, now admitted to cardiac telemetry for further management.     Problems discussed and associated plan:    #Acute on chronic HFrEF  - PRO BnP 7199  - EKG ST LBBB  - TTE 4/21/24 EF 35 to 40%.Moderately decreased global left ventricular systolic function.Calcified aortic valve with mildly decreased opening.Mild-to-moderate mitral regurgitation.Mild tricuspid regurgitation. mild dilated LA. Estimated pulmonary artery systolic pressure is 35.5 mmHg assuming a   right atrial pressure of 8 mmHg, which is consistent with borderline   pulmonary hypertension.  -Monitor on tele  -Troponin 22 -> 23  -Lipid   HDL 75 TRIGL 78  -A1C 6.7%  -c/w  Lasix 40mg IVP BID  with much improvement in urine output , decreased to 40 IV daily 4/21 (Cr trending up and symptomatic improvement)  -Strict I&Os, daily standing weight, fluid restriction   -Monitor Lytes, keep K>4, Mg>2, replete as needed  -start [id number]/51mg and Farxiga 10mg, (pt told she has weak heart outpatient)    diagnosed 3 months ago  - EP consulted for biv -AICD placement, rec cardiac MRI (ordered)  - Daughter to bring in outpatient records today   -Ferritin 42, iron sat 14%,  started IV Iron 200mg x 5 days (4/20-4/24)    #HTN   - stable   -discontinue home Losartan 100mg QD started Entresto 49/51mg  -Coreg (home dose) 3.125 mg increased to 6.25 mg   -hold home HCTZ while on IV lasix     # HLD  -Lipid   HDL 75 TRIGL 78  - consider starting on statin      #DM  - A1C 6.7  -on Metformin at home, will hold inpatient  -ISS/ Finger sticks   - BMGs controlled  - consistent carb diet     #LEIGH  - Patient in the process of receiving CPAP at home, follow up with pulm on discharge   CPAP at night    #DVT ppx/Dispo  - Lovenox 40   - Home when meidcally cleared       Please contact me with any questions or concerns at [phone]

## 2024-04-21 NOTE — CONSULT NOTE ADULT - ASSESSMENT
Cards: Dr. Baugh in [place] [phone] or 2003     77 yo female with PMHx of HTN, DM, CHF LEIGH (not on CPap) who presented to [facility] ED w/ complaints of SOB.  Pt admitted for CHF exac.  Echo Shows EF 35-40%. EP was consulted for ICD.  LBBB on EKG    CHF EF 35-40%  LBBB  Dm  HTN    Plan:  Dtr will bring outside reports in (echo, CCTA).  Please make copies and put in pt's chart  Recommend cardiac MRI  Cont GDMT  pending review of the above test results, pt will likely require a BIV ICD this admission

## 2024-04-21 NOTE — CONSULT NOTE ADULT - SUBJECTIVE AND OBJECTIVE BOX
Patient is a 78y old  Female who presents with a chief complaint of Shortness of Breath (20 Apr 2024 12:44)    HPI:  Mrs. Avani Vasquez is 79 yo female with PMHx of HTN, DM, LEIGH (not on CPap) who presented to [facility] ED w/ complaints of SOB worsening over past couple of days. Patients daughter Shabnam at bedside at time of interview. Patient reports she has been progressively SOB, worsening over past 2 days, daughter states patient was initially able to walk around the house now reporting significant SOB with couple of steps. Patient endorses pleuritic chest pain on exertion but comfortable at rest. Patient was seen by a pulmonologist in [place] who endorsed patient has significant LEIGH, was recommended to start CPap but has not be able to get an appointment. Patient has a cardiologist in [place] whom she reports has not been able to get an appointment with. Patient endorses she sleeps with 3-4 pillows, and has noticed LE swelling since onset of symptoms. Daughter reports she is on HCTZ at home but endorses some med non-compliance as hydrochlorthiazide makes her feel dizzy. Patient endorses orthopnea and PND. Of note, patient denies active chest pain, syncope, dizziness, recent illness, sick contacts or recent travel.     EP: Per pt's dtr, pt had an echo in December 2023 which showed "moderate to severely reduced EF" and a CCTA in January.  Pt has been on Losartan as an outpt which she takes when she feels like her BP might be high.  Pt has a LBBB on EKG.  Echo this admission shows EF 35-40%    PAST MEDICAL & SURGICAL HISTORY:  Hypertension    DM (diabetes mellitus)    LEIGH (obstructive sleep apnea)    No significant past surgical history    PREVIOUS DIAGNOSTIC TESTING:      ECHO  FINDINGS:  < from: TTE Echo Complete w/o Contrast w/ Doppler (04.21.24 @ 01:28) >  Summary:   1. Left ventricular ejection fraction, by visual estimation, is 35 to   40%.   2. Moderately decreased global left ventricular systolic function.   3. Multiple left ventricular regional wall motion abnormalities exist.   See wall motion findings.   4. Spectral Doppler shows impaired relaxation pattern of left   ventricular myocardial filling (Grade I diastolic dysfunction).   5. Mildly enlarged left atrium.   6. Calcified aortic valve with mildly decreased opening.   7. Mild-to-moderate mitral regurgitation.   8. Mild tricuspid regurgitation.   9. Estimated pulmonary artery systolic pressure is 35.5 mmHg assuming a   right atrial pressure of 8 mmHg, which is consistent with borderline   pulmonary hypertension.  10. Trivial pericardial effusion.    < end of copied text >    MEDICATIONS  (STANDING):  carvedilol 6.25 milliGRAM(s) Oral every 12 hours  dapagliflozin 10 milliGRAM(s) Oral every 24 hours  dextrose 10% Bolus 125 milliLiter(s) IV Bolus once  dextrose 5%. 1000 milliLiter(s) (100 mL/Hr) IV Continuous <Continuous>  dextrose 5%. 1000 milliLiter(s) (50 mL/Hr) IV Continuous <Continuous>  dextrose 50% Injectable 25 Gram(s) IV Push once  dextrose 50% Injectable 12.5 Gram(s) IV Push once  enoxaparin Injectable 40 milliGRAM(s) SubCutaneous every 24 hours  glucagon  Injectable 1 milliGRAM(s) IntraMuscular once  influenza  Vaccine (HIGH DOSE) 0.7 milliLiter(s) IntraMuscular once  insulin lispro (ADMELOG) corrective regimen sliding scale   SubCutaneous three times a day before meals  insulin lispro (ADMELOG) corrective regimen sliding scale   SubCutaneous at bedtime  iron sucrose IVPB 200 milliGRAM(s) IV Intermittent every 24 hours  sacubitril 49 mG/valsartan 51 mG 1 Tablet(s) Oral two times a day    MEDICATIONS  (PRN):  dextrose Oral Gel 15 Gram(s) Oral once PRN Blood Glucose LESS THAN 70 milliGRAM(s)/deciliter      FAMILY HISTORY:  No pertinent family history in first degree relatives    SOCIAL HISTORY:  none  CIGARETTES:  none  ALCOHOL:  none  Past Surgical History:    Allergies:    No Known Allergies      REVIEW OF SYSTEMS:  as above    Vital Signs Last 24 Hrs  T(C): 36.7 (21 Apr 2024 07:29), Max: 37.6 (20 Apr 2024 15:35)  T(F): 98 (21 Apr 2024 07:29), Max: 99.6 (20 Apr 2024 15:35)  HR: 86 (21 Apr 2024 07:29) (85 - 92)  BP: 116/74 (21 Apr 2024 07:29) (116/74 - 135/76)  BP(mean): 90 (21 Apr 2024 07:29) (90 - 99)  RR: 18 (21 Apr 2024 04:11) (18 - 19)  SpO2: 100% (21 Apr 2024 04:11) (97% - 100%)    PHYSICAL EXAM:    GENERAL: In no apparent distress, well nourished, and hydrated.  HEART: Regular rate and rhythm; No murmurs, rubs, or gallops.  PULMONARY: Clear to auscultation and perfusion.  No rales, wheezing, or rhonchi bilaterally.  ABDOMEN: Soft, Nontender, Nondistended; Bowel sounds present  EXTREMITIES:  2+ Peripheral Pulses, No clubbing, cyanosis, or edema  NEUROLOGICAL: Grossly nonfocal    INTERPRETATION OF TELEMETRY:  NSR    ECG:  < from: 12 Lead ECG (04.19.24 @ 10:37) >  Ventricular Rate 103 BPM    Atrial Rate 103 BPM    P-R Interval 156 ms    QRS Duration 134 ms    Q-T Interval 394 ms    QTC Calculation(Bazett) 516 ms    P Axis 66 degrees    R Axis 106 degrees    T Axis -85 degrees    Diagnosis Line Sinus tachycardia  Rightward axis  Non-specific intra-ventricular conduction block  Cannot rule out Anterior infarct , age undetermined  T wave abnormality, consider inferior ischemia  Abnormal ECG  No previous ECGs available    < end of copied text >      LABS:                        11.1   5.72  )-----------( 318      ( 21 Apr 2024 05:18 )             34.5     04-21    145  |  101  |  28<H>  ----------------------------<  119<H>  3.7   |  31  |  1.3    Ca    9.2      21 Apr 2024 05:18  Mg     1.8     04-21    TPro  7.1  /  Alb  4.2  /  TBili  0.7  /  DBili  x   /  AST  34  /  ALT  66<H>  /  AlkPhos  84  04-19      RADIOLOGY & ADDITIONAL STUDIES:

## 2024-04-22 ENCOUNTER — TRANSCRIPTION ENCOUNTER (OUTPATIENT)
Age: 79
End: 2024-04-22

## 2024-04-22 LAB
ANION GAP SERPL CALC-SCNC: 14 MMOL/L — SIGNIFICANT CHANGE UP (ref 7–14)
BASOPHILS # BLD AUTO: 0.05 K/UL — SIGNIFICANT CHANGE UP (ref 0–0.2)
BASOPHILS NFR BLD AUTO: 0.9 % — SIGNIFICANT CHANGE UP (ref 0–1)
BUN SERPL-MCNC: 35 MG/DL — HIGH (ref 10–20)
CALCIUM SERPL-MCNC: 9.5 MG/DL — SIGNIFICANT CHANGE UP (ref 8.4–10.5)
CHLORIDE SERPL-SCNC: 102 MMOL/L — SIGNIFICANT CHANGE UP (ref 98–110)
CO2 SERPL-SCNC: 28 MMOL/L — SIGNIFICANT CHANGE UP (ref 17–32)
CREAT SERPL-MCNC: 1.3 MG/DL — SIGNIFICANT CHANGE UP (ref 0.7–1.5)
EGFR: 43 ML/MIN/1.73M2 — LOW
EOSINOPHIL # BLD AUTO: 0.22 K/UL — SIGNIFICANT CHANGE UP (ref 0–0.7)
EOSINOPHIL NFR BLD AUTO: 4.1 % — SIGNIFICANT CHANGE UP (ref 0–8)
GLUCOSE BLDC GLUCOMTR-MCNC: 147 MG/DL — HIGH (ref 70–99)
GLUCOSE BLDC GLUCOMTR-MCNC: 147 MG/DL — HIGH (ref 70–99)
GLUCOSE BLDC GLUCOMTR-MCNC: 148 MG/DL — HIGH (ref 70–99)
GLUCOSE BLDC GLUCOMTR-MCNC: 176 MG/DL — HIGH (ref 70–99)
GLUCOSE BLDC GLUCOMTR-MCNC: 240 MG/DL — HIGH (ref 70–99)
GLUCOSE SERPL-MCNC: 133 MG/DL — HIGH (ref 70–99)
HCT VFR BLD CALC: 34.1 % — LOW (ref 37–47)
HGB BLD-MCNC: 11.1 G/DL — LOW (ref 12–16)
IMM GRANULOCYTES NFR BLD AUTO: 0.6 % — HIGH (ref 0.1–0.3)
LYMPHOCYTES # BLD AUTO: 2.45 K/UL — SIGNIFICANT CHANGE UP (ref 1.2–3.4)
LYMPHOCYTES # BLD AUTO: 46 % — SIGNIFICANT CHANGE UP (ref 20.5–51.1)
MAGNESIUM SERPL-MCNC: 2.4 MG/DL — SIGNIFICANT CHANGE UP (ref 1.8–2.4)
MCHC RBC-ENTMCNC: 26 PG — LOW (ref 27–31)
MCHC RBC-ENTMCNC: 32.6 G/DL — SIGNIFICANT CHANGE UP (ref 32–37)
MCV RBC AUTO: 79.9 FL — LOW (ref 81–99)
MONOCYTES # BLD AUTO: 0.71 K/UL — HIGH (ref 0.1–0.6)
MONOCYTES NFR BLD AUTO: 13.3 % — HIGH (ref 1.7–9.3)
NEUTROPHILS # BLD AUTO: 1.87 K/UL — SIGNIFICANT CHANGE UP (ref 1.4–6.5)
NEUTROPHILS NFR BLD AUTO: 35.1 % — LOW (ref 42.2–75.2)
NRBC # BLD: 0 /100 WBCS — SIGNIFICANT CHANGE UP (ref 0–0)
NT-PROBNP SERPL-SCNC: 1140 PG/ML — HIGH (ref 0–300)
PLATELET # BLD AUTO: 350 K/UL — SIGNIFICANT CHANGE UP (ref 130–400)
PMV BLD: 10.8 FL — HIGH (ref 7.4–10.4)
POTASSIUM SERPL-MCNC: 4.5 MMOL/L — SIGNIFICANT CHANGE UP (ref 3.5–5)
POTASSIUM SERPL-SCNC: 4.5 MMOL/L — SIGNIFICANT CHANGE UP (ref 3.5–5)
RBC # BLD: 4.27 M/UL — SIGNIFICANT CHANGE UP (ref 4.2–5.4)
RBC # FLD: 14.9 % — HIGH (ref 11.5–14.5)
SODIUM SERPL-SCNC: 144 MMOL/L — SIGNIFICANT CHANGE UP (ref 135–146)
WBC # BLD: 5.33 K/UL — SIGNIFICANT CHANGE UP (ref 4.8–10.8)
WBC # FLD AUTO: 5.33 K/UL — SIGNIFICANT CHANGE UP (ref 4.8–10.8)

## 2024-04-22 PROCEDURE — 99233 SBSQ HOSP IP/OBS HIGH 50: CPT

## 2024-04-22 PROCEDURE — 99418 PROLNG IP/OBS E/M EA 15 MIN: CPT

## 2024-04-22 PROCEDURE — G0316 PROLONG INPT EVAL ADD15 M: CPT

## 2024-04-22 RX ORDER — FUROSEMIDE 40 MG
40 TABLET ORAL DAILY
Refills: 0 | Status: DISCONTINUED | OUTPATIENT
Start: 2024-04-22 | End: 2024-04-23

## 2024-04-22 RX ADMIN — SACUBITRIL AND VALSARTAN 1 TABLET(S): 24; 26 TABLET, FILM COATED ORAL at 05:24

## 2024-04-22 RX ADMIN — DAPAGLIFLOZIN 10 MILLIGRAM(S): 10 TABLET, FILM COATED ORAL at 05:24

## 2024-04-22 RX ADMIN — Medication 2: at 17:04

## 2024-04-22 RX ADMIN — CARVEDILOL PHOSPHATE 6.25 MILLIGRAM(S): 80 CAPSULE, EXTENDED RELEASE ORAL at 05:24

## 2024-04-22 RX ADMIN — ENOXAPARIN SODIUM 40 MILLIGRAM(S): 100 INJECTION SUBCUTANEOUS at 17:06

## 2024-04-22 RX ADMIN — CARVEDILOL PHOSPHATE 6.25 MILLIGRAM(S): 80 CAPSULE, EXTENDED RELEASE ORAL at 17:04

## 2024-04-22 RX ADMIN — SACUBITRIL AND VALSARTAN 1 TABLET(S): 24; 26 TABLET, FILM COATED ORAL at 17:04

## 2024-04-22 RX ADMIN — Medication 0: at 22:09

## 2024-04-22 RX ADMIN — Medication 40 MILLIGRAM(S): at 11:25

## 2024-04-22 NOTE — DISCHARGE NOTE NURSING/CASE MANAGEMENT/SOCIAL WORK - NSDCPEFALRISK_GEN_ALL_CORE
For information on Fall & Injury Prevention, visit: [email or web address] OR  [email or web address] OR  https://www.cdc.gov/steadi/patient.html

## 2024-04-22 NOTE — PROGRESS NOTE ADULT - SUBJECTIVE AND OBJECTIVE BOX
INTERVAL HPI/OVERNIGHT EVENTS:  No acute events overnight    MEDICATIONS  (STANDING):  carvedilol 6.25 milliGRAM(s) Oral every 12 hours  dapagliflozin 10 milliGRAM(s) Oral every 24 hours  dextrose 10% Bolus 125 milliLiter(s) IV Bolus once  dextrose 5%. 1000 milliLiter(s) (100 mL/Hr) IV Continuous <Continuous>  dextrose 5%. 1000 milliLiter(s) (50 mL/Hr) IV Continuous <Continuous>  dextrose 50% Injectable 12.5 Gram(s) IV Push once  dextrose 50% Injectable 25 Gram(s) IV Push once  enoxaparin Injectable 40 milliGRAM(s) SubCutaneous every 24 hours  furosemide    Tablet 40 milliGRAM(s) Oral daily  glucagon  Injectable 1 milliGRAM(s) IntraMuscular once  influenza  Vaccine (HIGH DOSE) 0.7 milliLiter(s) IntraMuscular once  insulin lispro (ADMELOG) corrective regimen sliding scale   SubCutaneous three times a day before meals  insulin lispro (ADMELOG) corrective regimen sliding scale   SubCutaneous at bedtime  iron sucrose IVPB 200 milliGRAM(s) IV Intermittent every 24 hours  sacubitril 49 mG/valsartan 51 mG 1 Tablet(s) Oral two times a day    MEDICATIONS  (PRN):  dextrose Oral Gel 15 Gram(s) Oral once PRN Blood Glucose LESS THAN 70 milliGRAM(s)/deciliter      Allergies    No Known Allergies    Intolerances        REVIEW OF SYSTEMS: No CP, palpitations, dizziness or SOB     Vital Signs Last 24 Hrs  T(C): 36.1 (22 Apr 2024 07:07), Max: 36.8 (21 Apr 2024 23:51)  T(F): 97 (22 Apr 2024 07:07), Max: 98.2 (21 Apr 2024 23:51)  HR: 83 (22 Apr 2024 07:07) (83 - 93)  BP: 100/58 (22 Apr 2024 07:07) (100/58 - 115/68)  BP(mean): 72 (22 Apr 2024 07:07) (72 - 86)  RR: 18 (22 Apr 2024 07:07) (18 - 18)  SpO2: 100% (21 Apr 2024 23:51) (91% - 100%)    Parameters below as of 22 Apr 2024 07:07  Patient On (Oxygen Delivery Method): room air        04-21-24 @ 07:01 - 04-22-24 @ 07:00  --------------------------------------------------------  IN: 580 mL / OUT: 750 mL / NET: -170 mL        Physical Exam  GENERAL: In no apparent distress, well nourished, and hydrated.  EYES: EOMI, PERRLA, conjunctiva and sclera clear  NECK: Supple  HEART: Regular rate and rhythm; No murmurs, rubs, or gallops.  PULMONARY: Clear to auscultation and perfusion.  No rales, wheezing, or rhonchi bilaterally.  EXTREMITIES:  2+ Peripheral Pulses, No clubbing, cyanosis, or edema  NEUROLOGICAL: Grossly nonfocal    LABS:                        11.1   5.33  )-----------( 350      ( 22 Apr 2024 06:12 )             34.1     04-22    144  |  102  |  35<H>  ----------------------------<  133<H>  4.5   |  28  |  1.3    Ca    9.5      22 Apr 2024 06:12  Mg     2.4     04-22        Urinalysis Basic - ( 22 Apr 2024 06:12 )    Color: x / Appearance: x / SG: x / pH: x  Gluc: 133 mg/dL / Ketone: x  / Bili: x / Urobili: x   Blood: x / Protein: x / Nitrite: x   Leuk Esterase: x / RBC: x / WBC x   Sq Epi: x / Non Sq Epi: x / Bacteria: x        RADIOLOGY & ADDITIONAL TESTS:

## 2024-04-22 NOTE — DISCHARGE NOTE NURSING/CASE MANAGEMENT/SOCIAL WORK - PATIENT PORTAL LINK FT
You can access the FollowMyHealth Patient Portal offered by [facility] by registering at the following website: [email or web address]. By joining [employer]’s FollowMyHealth portal, you will also be able to view your health information using other applications (apps) compatible with our system.

## 2024-04-22 NOTE — PROGRESS NOTE ADULT - SUBJECTIVE AND OBJECTIVE BOX
Chief complaint: Patient is a 78y old  Female who presents with a chief complaint of Shortness of Breath (19 Apr 2024 13:18)    Interval history: Patient was seen and evaluated at bedside this AM. NAD. No overnight events. Patient denies shortness of breath and breathing better    Tele HR 60-90's  Sats 100% on RA  sleeping comfortable in the bed    Review of systems: A complete 10-point review of systems was obtained and is negative except as stated in the interval history.    Vitals:  Vital Signs Last 24 Hrs  T(C): 36.7 (21 Apr 2024 07:29), Max: 37.6 (20 Apr 2024 15:35)  T(F): 98 (21 Apr 2024 07:29), Max: 99.6 (20 Apr 2024 15:35)  HR: 86 (21 Apr 2024 07:29) (85 - 92)  BP: 116/74 (21 Apr 2024 07:29) (116/74 - 135/76)  BP(mean): 90 (21 Apr 2024 07:29) (90 - 99)  RR: 18 (21 Apr 2024 04:11) (18 - 19)  SpO2: 100% (21 Apr 2024 04:11) (97% - 100%)      Ins & outs:     04-19 @ 07:01  -  04-20 @ 07:00  --------------------------------------------------------  IN: 360 mL / OUT: 2200 mL / NET: -1840 mL    I&O's Summary    20 Apr 2024 07:01  -  21 Apr 2024 07:00  --------------------------------------------------------  IN: 240 mL / OUT: 3400 mL / NET: -3160 mL    21 Apr 2024 07:01  -  21 Apr 2024 11:25  --------------------------------------------------------  IN: 220 mL / OUT: 0 mL / NET: 220 mL          Weight trend:  Weight (kg): 77.6 (04-19)    Physical exam:  General: No apparent distress  HEENT: Anicteric sclera. Moist mucous membranes.  Cardiac: Regular rate and rhythm. PAPITO LUSB, RUSB  Vascular: Symmetric radial pulses. Dorsalis pedis pulses palpable.   Respiratory: Normal effort. B/L clear lung sounds   Abdomen: Soft, nontender. Audible bowel sounds.   Extremities: Warm, No pitting edema. No cyanosis or clubbing.   Skin: Warm and dry. No rash.   Neurologic: Grossly normal motor function.   Psychiatric: Oriented to person, place, and time.     Data reviewed:  - Telemetry: SR 60-90's  - ECG (4/19/24):  Sinus tachycardia, LBBB  - TTE: < from: TTE Echo Complete w/o Contrast w/ Doppler (04.21.24 @ 01:28) >  Summary:   1. Left ventricular ejection fraction, by visual estimation, is 35 to   40%.   2. Moderately decreased global left ventricular systolic function.   3. Multiple left ventricular regional wall motion abnormalities exist.   See wall motion findings.   4. Spectral Doppler shows impaired relaxation pattern of left   ventricular myocardial filling (Grade I diastolic dysfunction).   5. Mildly enlarged left atrium.   6. Calcified aortic valve with mildly decreased opening.   7. Mild-to-moderate mitral regurgitation.   8. Mild tricuspid regurgitation.   9. Estimated pulmonary artery systolic pressure is 35.5 mmHg assuming a   right atrial pressure of 8 mmHg, which is consistent with borderline   pulmonary hypertension.  10. Trivial pericardial effusion.    < end of copied text >    - Chest x-ray (4/19/24): Enlarged cardiac silhouette, interstitial opacities, and trace effusions.    - Labs:                        11.1   5.72  )-----------( 318      ( 21 Apr 2024 05:18 )             34.5     04-21    145  |  101  |  28<H>  ----------------------------<  119<H>  3.7   |  31  |  1.3    Ca    9.2      21 Apr 2024 05:18  Mg     1.8     04-21    TPro  7.1  /  Alb  4.2  /  TBili  0.7  /  DBili  x   /  AST  34  /  ALT  66<H>  /  AlkPhos  84  04-19    LIVER FUNCTIONS - ( 19 Apr 2024 11:50 )  Alb: 4.2 g/dL / Pro: 7.1 g/dL / ALK PHOS: 84 U/L / ALT: 66 U/L / AST: 34 U/L / GGT: x               Lactate Trend    Urinalysis Basic - ( 21 Apr 2024 05:18 )    Color: x / Appearance: x / SG: x / pH: x  Gluc: 119 mg/dL / Ketone: x  / Bili: x / Urobili: x   Blood: x / Protein: x / Nitrite: x   Leuk Esterase: x / RBC: x / WBC x   Sq Epi: x / Non Sq Epi: x / Bacteria: x        Triglycerides, Serum: 76 mg/dL (04-20-24 @ 06:30)  LDL Cholesterol Calculated: 108 mg/dL (04-20-24 @ 06:30)    Thyroid Stimulating Hormone, Serum: 2.18 uIU/mL (04-20-24 @ 06:30)    Urinalysis Basic - ( 20 Apr 2024 06:30 )    Color: x / Appearance: x / SG: x / pH: x  Gluc: 119 mg/dL / Ketone: x  / Bili: x / Urobili: x   Blood: x / Protein: x / Nitrite: x   Leuk Esterase: x / RBC: x / WBC x   Sq Epi: x / Non Sq Epi: x / Bacteria: x    Medications:  MEDICATIONS  (STANDING):  carvedilol 6.25 milliGRAM(s) Oral every 12 hours  dapagliflozin 10 milliGRAM(s) Oral every 24 hours  dextrose 10% Bolus 125 milliLiter(s) IV Bolus once  dextrose 5%. 1000 milliLiter(s) (100 mL/Hr) IV Continuous <Continuous>  dextrose 5%. 1000 milliLiter(s) (50 mL/Hr) IV Continuous <Continuous>  dextrose 50% Injectable 12.5 Gram(s) IV Push once  dextrose 50% Injectable 25 Gram(s) IV Push once  enoxaparin Injectable 40 milliGRAM(s) SubCutaneous every 24 hours  glucagon  Injectable 1 milliGRAM(s) IntraMuscular once  influenza  Vaccine (HIGH DOSE) 0.7 milliLiter(s) IntraMuscular once  insulin lispro (ADMELOG) corrective regimen sliding scale   SubCutaneous three times a day before meals  insulin lispro (ADMELOG) corrective regimen sliding scale   SubCutaneous at bedtime  iron sucrose IVPB 200 milliGRAM(s) IV Intermittent every 24 hours  sacubitril 49 mG/valsartan 51 mG 1 Tablet(s) Oral two times a day    MEDICATIONS  (PRN):  dextrose Oral Gel 15 Gram(s) Oral once PRN Blood Glucose LESS THAN 70 milliGRAM(s)/deciliter      PRN:     Allergies    No Known Allergies    Intolerances

## 2024-04-22 NOTE — PROGRESS NOTE ADULT - TIME BILLING
Care coordination, Outside record review and interpretation, Detailed education about GDMT + CRT-D/P

## 2024-04-22 NOTE — PROGRESS NOTE ADULT - ASSESSMENT
77 yo female with PMHx of HTN, DM, LEIGH (not on CPap) who presented to [facility] ED w/ complaints of SOB worsening over past couple of days, found to be volume overloaded, now admitted to cardiac telemetry for further management. Found to have EF 37% (Dec '23), still with HFrEF now. No syncope    Impression:  Acute on Chronic HF LVEF 35-40%  LBBB  HTN  DM    Plan:  - Cardiac MRI  - At this time, after extensive conversation with patient/daughter, if MRI normal then will continue with GDMT and reassess LVEF in 3 months  - Discussed the options of BiV-ICD vs BiV-PPM as possibilities of treatments and at this time daughter is hesitant.  - Cont tele monitoring  - Monitor electrolytes, maintain WNL  - Will follow

## 2024-04-22 NOTE — PROGRESS NOTE ADULT - ASSESSMENT
Assessment:  79 yo female with PMHx of HTN, DM, LEIGH (not on CPap) who presented to [facility] ED w/ complaints of SOB worsening over past couple of days, found to be volume overloaded, now admitted to cardiac telemetry for further management.     Problems discussed and associated plan:    Assessment:  79 yo female with PMHx of HTN, DM, LEIGH (not on CPap) who presented to [facility] ED w/ complaints of SOB worsening over past couple of days, found to be volume overloaded, now admitted to cardiac telemetry for further management.     Problems discussed and associated plan:    #Acute on chronic HFrEF  - PRO BnP 7199  - EKG ST LBBB  - TTE 4/21/24 EF 35 to 40%.Moderately decreased global left ventricular systolic function. Calcified aortic valve with mildly decreased opening.Mild-to-moderate mitral regurgitation.Mild tricuspid regurgitation. mild dilated LA. Estimated pulmonary artery systolic pressure is 35.5 mmHg assuming a   right atrial pressure of 8 mmHg, which is consistent with borderline   pulmonary hypertension.  -Monitor on tele  -Troponin 22 -> 23  -Lipid   HDL 75 TRIGL 78  -A1C 6.7%  - completed Lasix 40mg IVP BID  with much improvement in urine output ,   - 0422th start PO Lasix 40 mg QD  -Strict I&Os, daily standing weight, fluid restriction   -Monitor Lytes, keep K>4, Mg>2, replete as needed  -start pwpzspai52/51mg and Farxiga 10mg, (pt told she has weak heart outpatient)    diagnosed 3 months ago  - EP consulted for biv -AICD placement, rec cardiac MRI (ordered)  - Daughter to bring in outpatient records today   -Ferritin 42, iron sat 14%,  started IV Iron 200mg x 5 days (4/20-4/24)    #HTN   - stable   -discontinue home Losartan 100mg QD started Entresto 49/51mg  -Coreg (home dose) 3.125 mg increased to 6.25 mg   -hold home HCTZ while on IV lasix     # HLD  -Lipid   HDL 75 TRIGL 78  - consider starting on statin      #DM  - A1C 6.7  -on Metformin at home, will hold inpatient  -ISS/ Finger sticks   - BMGs controlled  - consistent carb diet     #LEIGH  - Patient in the process of receiving CPAP at home, follow up with pulm on discharge   CPAP at night    #DVT ppx/Dispo  - Lovenox 40   - Home when medically cleared       Please contact me with any questions or concerns at [phone]

## 2024-04-23 ENCOUNTER — TRANSCRIPTION ENCOUNTER (OUTPATIENT)
Age: 79
End: 2024-04-23

## 2024-04-23 VITALS
TEMPERATURE: 98 F | DIASTOLIC BLOOD PRESSURE: 59 MMHG | HEART RATE: 92 BPM | SYSTOLIC BLOOD PRESSURE: 108 MMHG | RESPIRATION RATE: 18 BRPM

## 2024-04-23 PROBLEM — I10 ESSENTIAL (PRIMARY) HYPERTENSION: Chronic | Status: ACTIVE | Noted: 2024-04-19

## 2024-04-23 PROBLEM — E11.9 TYPE 2 DIABETES MELLITUS WITHOUT COMPLICATIONS: Chronic | Status: ACTIVE | Noted: 2024-04-19

## 2024-04-23 PROBLEM — G47.33 OBSTRUCTIVE SLEEP APNEA (ADULT) (PEDIATRIC): Chronic | Status: ACTIVE | Noted: 2024-04-19

## 2024-04-23 LAB
ANION GAP SERPL CALC-SCNC: 12 MMOL/L — SIGNIFICANT CHANGE UP (ref 7–14)
BUN SERPL-MCNC: 34 MG/DL — HIGH (ref 10–20)
CALCIUM SERPL-MCNC: 9.9 MG/DL — SIGNIFICANT CHANGE UP (ref 8.4–10.5)
CHLORIDE SERPL-SCNC: 100 MMOL/L — SIGNIFICANT CHANGE UP (ref 98–110)
CO2 SERPL-SCNC: 31 MMOL/L — SIGNIFICANT CHANGE UP (ref 17–32)
CREAT SERPL-MCNC: 1.4 MG/DL — SIGNIFICANT CHANGE UP (ref 0.7–1.5)
EGFR: 39 ML/MIN/1.73M2 — LOW
GLUCOSE BLDC GLUCOMTR-MCNC: 139 MG/DL — HIGH (ref 70–99)
GLUCOSE BLDC GLUCOMTR-MCNC: 166 MG/DL — HIGH (ref 70–99)
GLUCOSE BLDC GLUCOMTR-MCNC: 201 MG/DL — HIGH (ref 70–99)
GLUCOSE SERPL-MCNC: 141 MG/DL — HIGH (ref 70–99)
HCT VFR BLD CALC: 38.3 % — SIGNIFICANT CHANGE UP (ref 37–47)
HGB BLD-MCNC: 12.1 G/DL — SIGNIFICANT CHANGE UP (ref 12–16)
MAGNESIUM SERPL-MCNC: 2.7 MG/DL — HIGH (ref 1.8–2.4)
MCHC RBC-ENTMCNC: 25.5 PG — LOW (ref 27–31)
MCHC RBC-ENTMCNC: 31.6 G/DL — LOW (ref 32–37)
MCV RBC AUTO: 80.8 FL — LOW (ref 81–99)
NRBC # BLD: 0 /100 WBCS — SIGNIFICANT CHANGE UP (ref 0–0)
PLATELET # BLD AUTO: 358 K/UL — SIGNIFICANT CHANGE UP (ref 130–400)
PMV BLD: 10.7 FL — HIGH (ref 7.4–10.4)
POTASSIUM SERPL-MCNC: 4.7 MMOL/L — SIGNIFICANT CHANGE UP (ref 3.5–5)
POTASSIUM SERPL-SCNC: 4.7 MMOL/L — SIGNIFICANT CHANGE UP (ref 3.5–5)
RBC # BLD: 4.74 M/UL — SIGNIFICANT CHANGE UP (ref 4.2–5.4)
RBC # FLD: 15.1 % — HIGH (ref 11.5–14.5)
SODIUM SERPL-SCNC: 143 MMOL/L — SIGNIFICANT CHANGE UP (ref 135–146)
WBC # BLD: 5.97 K/UL — SIGNIFICANT CHANGE UP (ref 4.8–10.8)
WBC # FLD AUTO: 5.97 K/UL — SIGNIFICANT CHANGE UP (ref 4.8–10.8)

## 2024-04-23 PROCEDURE — 99233 SBSQ HOSP IP/OBS HIGH 50: CPT

## 2024-04-23 RX ORDER — DAPAGLIFLOZIN 10 MG/1
1 TABLET, FILM COATED ORAL
Qty: 90 | Refills: 0
Start: 2024-04-23

## 2024-04-23 RX ORDER — POTASSIUM CHLORIDE 20 MEQ
40 PACKET (EA) ORAL EVERY 4 HOURS
Refills: 0 | Status: DISCONTINUED | OUTPATIENT
Start: 2024-04-23 | End: 2024-04-23

## 2024-04-23 RX ORDER — SACUBITRIL AND VALSARTAN 24; 26 MG/1; MG/1
1 TABLET, FILM COATED ORAL
Qty: 90 | Refills: 0
Start: 2024-04-23

## 2024-04-23 RX ORDER — EMPAGLIFLOZIN 10 MG/1
1 TABLET, FILM COATED ORAL
Qty: 30 | Refills: 0
Start: 2024-04-23 | End: 2024-05-22

## 2024-04-23 RX ORDER — HYDROCHLOROTHIAZIDE 25 MG
1 TABLET ORAL
Refills: 0 | DISCHARGE

## 2024-04-23 RX ORDER — CARVEDILOL PHOSPHATE 80 MG/1
1 CAPSULE, EXTENDED RELEASE ORAL
Refills: 0 | DISCHARGE

## 2024-04-23 RX ORDER — FUROSEMIDE 40 MG
1 TABLET ORAL
Qty: 90 | Refills: 0
Start: 2024-04-23

## 2024-04-23 RX ORDER — LOSARTAN POTASSIUM 100 MG/1
1 TABLET, FILM COATED ORAL
Refills: 0 | DISCHARGE

## 2024-04-23 RX ORDER — CARVEDILOL PHOSPHATE 80 MG/1
1 CAPSULE, EXTENDED RELEASE ORAL
Qty: 180 | Refills: 0
Start: 2024-04-23

## 2024-04-23 RX ADMIN — IRON SUCROSE 110 MILLIGRAM(S): 20 INJECTION, SOLUTION INTRAVENOUS at 05:33

## 2024-04-23 RX ADMIN — SACUBITRIL AND VALSARTAN 1 TABLET(S): 24; 26 TABLET, FILM COATED ORAL at 05:23

## 2024-04-23 RX ADMIN — CARVEDILOL PHOSPHATE 6.25 MILLIGRAM(S): 80 CAPSULE, EXTENDED RELEASE ORAL at 17:06

## 2024-04-23 RX ADMIN — DAPAGLIFLOZIN 10 MILLIGRAM(S): 10 TABLET, FILM COATED ORAL at 05:23

## 2024-04-23 RX ADMIN — Medication 40 MILLIGRAM(S): at 05:23

## 2024-04-23 RX ADMIN — SACUBITRIL AND VALSARTAN 1 TABLET(S): 24; 26 TABLET, FILM COATED ORAL at 17:06

## 2024-04-23 RX ADMIN — CARVEDILOL PHOSPHATE 6.25 MILLIGRAM(S): 80 CAPSULE, EXTENDED RELEASE ORAL at 05:23

## 2024-04-23 NOTE — CDI QUERY NOTE - NSCDIOTHERTXTBX_GEN_ALL_CORE_HH
Clinical documentation and/or evidence of the patient’s presentation, evaluation, and medical management, as evidenced below, may support a diagnosis that is not documented in the medical record.  In order to ensure accurate coding and accuracy of the clinical record, the documentation in this patient’s medical record requires additional clarification.    If you think the supporting documentation and/or clinical evidence supports a more specific diagnosis, please include more specific documentation of a diagnosis associated with these findings in your progress note and/or discharge summary.    Please clarify if the patient was found to have one of the following:      •Acute respiratory failure (with hypoxia / hypercapnia)    •Acute hypoxia                    •Other (specify)    Supporting documentation and/or clinical evidence:     4/19 ED Provider Note-  Patient is a 78 y.o female with PMHx... LEIGH who presents for eval of SOB worsening over past couple of days. Patient in mild acute distress.  Tachypneic with expiratory wheezing.  Placed on BiPAP and Lasix given, pt sitting on stretcher speaking full sentences but with increased WOB improved on BiPAP. Respiratory: BS present b/l, crackles present b/l, poor air exchange, (+) accessory muscle use improved on BiPAP, Constitutional: pt sitting on stretcher speaking full sentences but with increased WOB improved on BiPAP; Principal Discharge DX: New onset of congestive heart failure; Secondary Diagnosis: Shortness of breath. VBG: pH 7.33, pCO2 53, pO2 31, HCO3 28, O2 sat venous 40.9, Total Hgb 10.7, Calculate HCT 32.0, Base excess 1.3; Current orders- Oxygen 2L, continuous pulse ox    Vital signs flow sheet-  4/19 1533- 100% on BIPAP, RR 20  4/19 1547- 89% on room air, RR 20  4/19 1615- 92% on 2LNC, RR 20    Thank you,  Elsie Ellsworth, RN  [phone]

## 2024-04-23 NOTE — CHART NOTE - NSCHARTNOTEFT_GEN_A_CORE
In Response to the CDI Query Note, this patient presented to the ED on 4/19 demonstrating hypoxia and increased work of breathing consistent with acute respiratory failure secondary to an acute CHF exacerbation.

## 2024-04-23 NOTE — DISCHARGE NOTE PROVIDER - NSDCFUSCHEDAPPT_GEN_ALL_CORE_FT
rx done. 30-day supply. If BP looks good, we can send a 90-day supply.   Daniel Jensen Physician [facility]  CARDIOLOGY [address]  Scheduled Appointment: 06/11/2024

## 2024-04-23 NOTE — PROGRESS NOTE ADULT - NS ATTEND AMEND GEN_ALL_CORE FT
Breathing comfortable.  Hemodynamics stable.  Negative 3.2 L    ECHO: Mod to severe LV dysfunction.  Mild AS / MR.    Apparent CCTA without significant CAD few months ago.    1. Acute on chronic decompensated systolic CHF (volume overload / improving)    2. Apparent NICM    3. LBBB    - Change Lasix to PO  - Cont Coreg and Entresto  - Obtain records  - EP evaluation for CRT
NICM  LBBB    Cont Entresto, Coreg  MRI heart  I discussed the risks, benefits and alternatives for device implantation.    I reported a risk of major complications at 1% and minor complications at 3%. The details of the procedure and risks associated with undergoing the procedure were discussed in detail including but not limited to, death, myocardial ischemia, stroke, cardiac perforation, potential need for temporary pacemaker implantation, lung damage requiring chest tube (pneumothorax), blood clot, blood collection requiring blood transfusion or repeat surgery (hematoma), infection, or vascular injury. All questions were answered to satisfaction and the patient expressed verbal understanding of the procedure, the benefits, and risks.    They want to hold off on device for now. Risks/benefits discussed with daughter + patient at length. They understand.
Reported cardiomyopathy.    Breathing more comfortable with diuresis.  Hemodynamics stable.  Negative fluid balance.  Systolic murmurs at RUSB and apex.    1. Acute on chronic decompensated systolic CHF (volume overload)    2. Apparent cardiomyopathy    - Cont IV Lasix  - Cont Coreg and Entresto  - ECHO
Patient seen and examined. Pertinent labs, imaging and telemetry reviewed. I agree with the above:     Patient feeling better today. She denies SOB.   Discussed with patient and daughter. She is on GDMT and pending further work up with CMR.   -Outpatient records reviewed with CCTA from 01/2024 showing CAC 59 with LM normal, mLAD minimal, dLAD minimal, mLCx minimal, pRCA minimal.   -TTE from 12/2023 showing EF 37%.   -Patient with NICM.   -Pending CMR.   -Discussed possible BiV ICD with CRT to assist with symptoms and EF recovery.  -DAughter is hesitant.   -Discussed with EP and will await findings of CMR.   -If no findings and pt and daughter not amenable to ICD, will discharge on GDMT with follow up.  -Continue with current meds.     Discussed with patient, daughter, EP and ACP.
Patient seen and examined. Pertinent labs, imaging and telemetry reviewed. I agree with the above:     Patient feeling better today. She denies SOB.   Discussed with patient and daughter. She is on GDMT and pending further work up with CMR.   -Outpatient records reviewed with CCTA from 01/2024 showing CAC 59 with LM normal, mLAD minimal, dLAD minimal, mLCx minimal, pRCA minimal.   -TTE from 12/2023 showing EF 37%.   -Patient with NICM.   -Pending CMR. WIll not be done inpatient.   -Discussed possible BiV ICD with CRT to assist with symptoms and EF recovery.  -Daughter is hesitant.   -Will get CMR as outpatient and then decide on need for BiV ICD.   -Continue with current meds.     Discussed with patient, daughter, EP and ACP.

## 2024-04-23 NOTE — DISCHARGE NOTE PROVIDER - CARE PROVIDER_API CALL
Daniel Jensen  Cardiology  [address] [place] [address]  Phone: [phone]  Fax: [phone]  Follow Up Time: 2 weeks    Gustabo Le  Cardiac Electrophysiology  [address] [place] [id number]  Phone: [phone]  Fax: [phone]  Follow Up Time: 2 weeks

## 2024-04-23 NOTE — DISCHARGE NOTE PROVIDER - CARE PROVIDERS DIRECT ADDRESSES
[email or web address]

## 2024-04-23 NOTE — PROGRESS NOTE ADULT - ASSESSMENT
Assessment:  79 yo female with PMHx of HTN, DM, LEIGH (not on CPap) who presented to [facility] ED w/ complaints of SOB worsening over past couple of days, found to be volume overloaded, now admitted to cardiac telemetry for further management.     Problems discussed and associated plan:    Assessment:  79 yo female with PMHx of HTN, DM, LEIGH (not on CPap) who presented to [facility] ED w/ complaints of SOB worsening over past couple of days, found to be volume overloaded, now admitted to cardiac telemetry for further management.     Problems discussed and associated plan:    #Acute on chronic HFrEF  - PRO BnP 7199  - EKG ST LBBB  - TTE 4/21/24 EF 35 to 40%.Moderately decreased global left ventricular systolic function. Calcified aortic valve with mildly decreased opening.Mild-to-moderate mitral regurgitation.Mild tricuspid regurgitation. mild dilated LA. Estimated pulmonary artery systolic pressure is 35.5 mmHg assuming a   right atrial pressure of 8 mmHg, which is consistent with borderline   pulmonary hypertension.  -Monitor on tele  -Troponin 22 -> 23  -Lipid   HDL 75 TRIGL 78  -A1C 6.7%  - completed Lasix 40mg IVP BID  with much improvement in urine output ,   - 0422th start PO Lasix 40 mg QD  -Strict I&Os, daily standing weight, fluid restriction   -Monitor Lytes, keep K>4, Mg>2, replete as needed  -start qzyeuyki90/51mg and Farxiga 10mg, (pt told she has weak heart outpatient)    diagnosed 3 months ago  - EP consulted for biv -AICD placement, rec cardiac MRI (ordered)  - Daughter to bring in outpatient records today   -Ferritin 42, iron sat 14%,  started IV Iron 200mg x 5 days (4/20-4/24)    #HTN   - stable   -discontinue home Losartan 100mg QD started Entresto 49/51mg  -Coreg (home dose) 3.125 mg increased to 6.25 mg   -hold home HCTZ while on IV lasix     # HLD  -Lipid   HDL 75 TRIGL 78  - consider starting on statin      #DM  - A1C 6.7  -on Metformin at home, will hold inpatient  -ISS/ Finger sticks   - BMGs controlled  - consistent carb diet     #LEIGH  - Patient in the process of receiving CPAP at home, follow up with pulm on discharge   CPAP at night    #DVT ppx/Dispo  - Lovenox 40   - Home when medically cleared       Please contact me with any questions or concerns at [phone]

## 2024-04-23 NOTE — DISCHARGE NOTE PROVIDER - PROVIDER TOKENS
PROVIDER:[TOKEN:[[id number]],FOLLOWUP:[2 weeks]],PROVIDER:[TOKEN:[[id number]],FOLLOWUP:[2 weeks]]

## 2024-04-23 NOTE — DISCHARGE NOTE PROVIDER - NSDCHHNEEDSERVICE_GEN_ALL_CORE
Medication teaching and assessment/Teaching and training Ostomy care and management/Teaching and training

## 2024-04-23 NOTE — DISCHARGE NOTE PROVIDER - NSDCCPCAREPLAN_GEN_ALL_CORE_FT
PRINCIPAL DISCHARGE DIAGNOSIS  Diagnosis: New onset of congestive heart failure  Assessment and Plan of Treatment:       SECONDARY DISCHARGE DIAGNOSES  Diagnosis: Shortness of breath  Assessment and Plan of Treatment:

## 2024-04-23 NOTE — PROGRESS NOTE ADULT - SUBJECTIVE AND OBJECTIVE BOX
Chief complaint: Patient is a 78y old  Female who presents with a chief complaint of Shortness of Breath (19 Apr 2024 13:18)    Interval history: Patient was seen and evaluated at bedside this AM. NAD. No overnight events. Patient denies shortness of breath and breathing better    Tele HR 60-90's  Sats 100% on RA  sleeping comfortable in the bed    Review of systems: A complete 10-point review of systems was obtained and is negative except as stated in the interval history.    Vitals:  Vital Signs Last 24 Hrs  T(C): 36.7 (21 Apr 2024 07:29), Max: 37.6 (20 Apr 2024 15:35)  T(F): 98 (21 Apr 2024 07:29), Max: 99.6 (20 Apr 2024 15:35)  HR: 86 (21 Apr 2024 07:29) (85 - 92)  BP: 116/74 (21 Apr 2024 07:29) (116/74 - 135/76)  BP(mean): 90 (21 Apr 2024 07:29) (90 - 99)  RR: 18 (21 Apr 2024 04:11) (18 - 19)  SpO2: 100% (21 Apr 2024 04:11) (97% - 100%)      Ins & outs:     04-19 @ 07:01  -  04-20 @ 07:00  --------------------------------------------------------  IN: 360 mL / OUT: 2200 mL / NET: -1840 mL    I&O's Summary    20 Apr 2024 07:01  -  21 Apr 2024 07:00  --------------------------------------------------------  IN: 240 mL / OUT: 3400 mL / NET: -3160 mL    21 Apr 2024 07:01  -  21 Apr 2024 11:25  --------------------------------------------------------  IN: 220 mL / OUT: 0 mL / NET: 220 mL          Weight trend:  Weight (kg): 77.6 (04-19)    Physical exam:  General: No apparent distress  HEENT: Anicteric sclera. Moist mucous membranes.  Cardiac: Regular rate and rhythm. PAPITO LUSB, RUSB  Vascular: Symmetric radial pulses. Dorsalis pedis pulses palpable.   Respiratory: Normal effort. B/L clear lung sounds   Abdomen: Soft, nontender. Audible bowel sounds.   Extremities: Warm, No pitting edema. No cyanosis or clubbing.   Skin: Warm and dry. No rash.   Neurologic: Grossly normal motor function.   Psychiatric: Oriented to person, place, and time.     Data reviewed:  - Telemetry: SR 60-90's  - ECG (4/19/24):  Sinus tachycardia, LBBB  - TTE: < from: TTE Echo Complete w/o Contrast w/ Doppler (04.21.24 @ 01:28) >  Summary:   1. Left ventricular ejection fraction, by visual estimation, is 35 to   40%.   2. Moderately decreased global left ventricular systolic function.   3. Multiple left ventricular regional wall motion abnormalities exist.   See wall motion findings.   4. Spectral Doppler shows impaired relaxation pattern of left   ventricular myocardial filling (Grade I diastolic dysfunction).   5. Mildly enlarged left atrium.   6. Calcified aortic valve with mildly decreased opening.   7. Mild-to-moderate mitral regurgitation.   8. Mild tricuspid regurgitation.   9. Estimated pulmonary artery systolic pressure is 35.5 mmHg assuming a   right atrial pressure of 8 mmHg, which is consistent with borderline   pulmonary hypertension.  10. Trivial pericardial effusion.    < end of copied text >    - Chest x-ray (4/19/24): Enlarged cardiac silhouette, interstitial opacities, and trace effusions.    - Labs:                        11.1   5.72  )-----------( 318      ( 21 Apr 2024 05:18 )             34.5     04-21    145  |  101  |  28<H>  ----------------------------<  119<H>  3.7   |  31  |  1.3    Ca    9.2      21 Apr 2024 05:18  Mg     1.8     04-21    TPro  7.1  /  Alb  4.2  /  TBili  0.7  /  DBili  x   /  AST  34  /  ALT  66<H>  /  AlkPhos  84  04-19    LIVER FUNCTIONS - ( 19 Apr 2024 11:50 )  Alb: 4.2 g/dL / Pro: 7.1 g/dL / ALK PHOS: 84 U/L / ALT: 66 U/L / AST: 34 U/L / GGT: x               Lactate Trend    Urinalysis Basic - ( 21 Apr 2024 05:18 )    Color: x / Appearance: x / SG: x / pH: x  Gluc: 119 mg/dL / Ketone: x  / Bili: x / Urobili: x   Blood: x / Protein: x / Nitrite: x   Leuk Esterase: x / RBC: x / WBC x   Sq Epi: x / Non Sq Epi: x / Bacteria: x        Triglycerides, Serum: 76 mg/dL (04-20-24 @ 06:30)  LDL Cholesterol Calculated: 108 mg/dL (04-20-24 @ 06:30)    Thyroid Stimulating Hormone, Serum: 2.18 uIU/mL (04-20-24 @ 06:30)    Urinalysis Basic - ( 20 Apr 2024 06:30 )    Color: x / Appearance: x / SG: x / pH: x  Gluc: 119 mg/dL / Ketone: x  / Bili: x / Urobili: x   Blood: x / Protein: x / Nitrite: x   Leuk Esterase: x / RBC: x / WBC x   Sq Epi: x / Non Sq Epi: x / Bacteria: x    Medications:  MEDICATIONS  (STANDING):  carvedilol 6.25 milliGRAM(s) Oral every 12 hours  dapagliflozin 10 milliGRAM(s) Oral every 24 hours  dextrose 10% Bolus 125 milliLiter(s) IV Bolus once  dextrose 5%. 1000 milliLiter(s) (100 mL/Hr) IV Continuous <Continuous>  dextrose 5%. 1000 milliLiter(s) (50 mL/Hr) IV Continuous <Continuous>  dextrose 50% Injectable 12.5 Gram(s) IV Push once  dextrose 50% Injectable 25 Gram(s) IV Push once  enoxaparin Injectable 40 milliGRAM(s) SubCutaneous every 24 hours  glucagon  Injectable 1 milliGRAM(s) IntraMuscular once  influenza  Vaccine (HIGH DOSE) 0.7 milliLiter(s) IntraMuscular once  insulin lispro (ADMELOG) corrective regimen sliding scale   SubCutaneous three times a day before meals  insulin lispro (ADMELOG) corrective regimen sliding scale   SubCutaneous at bedtime  iron sucrose IVPB 200 milliGRAM(s) IV Intermittent every 24 hours  sacubitril 49 mG/valsartan 51 mG 1 Tablet(s) Oral two times a day    MEDICATIONS  (PRN):  dextrose Oral Gel 15 Gram(s) Oral once PRN Blood Glucose LESS THAN 70 milliGRAM(s)/deciliter      PRN:     Allergies    No Known Allergies    Intolerances       Chief complaint: Patient is a 78y old  Female who presents with a chief complaint of Shortness of Breath (19 Apr 2024 13:18)    Interval history: Patient was seen and evaluated at bedside this AM. NAD. No overnight events. Patient denies shortness of breath and breathing better        Review of systems: A complete 10-point review of systems was obtained and is negative except as stated in the interval history.    Vitals:  Vital Signs Last 24 Hrs  T(C): 36.7 (21 Apr 2024 07:29), Max: 37.6 (20 Apr 2024 15:35)  T(F): 98 (21 Apr 2024 07:29), Max: 99.6 (20 Apr 2024 15:35)  HR: 86 (21 Apr 2024 07:29) (85 - 92)  BP: 116/74 (21 Apr 2024 07:29) (116/74 - 135/76)  BP(mean): 90 (21 Apr 2024 07:29) (90 - 99)  RR: 18 (21 Apr 2024 04:11) (18 - 19)  SpO2: 100% (21 Apr 2024 04:11) (97% - 100%)      Ins & outs:     04-19 @ 07:01  -  04-20 @ 07:00  --------------------------------------------------------  IN: 360 mL / OUT: 2200 mL / NET: -1840 mL    I&O's Summary    20 Apr 2024 07:01  -  21 Apr 2024 07:00  --------------------------------------------------------  IN: 240 mL / OUT: 3400 mL / NET: -3160 mL    21 Apr 2024 07:01  -  21 Apr 2024 11:25  --------------------------------------------------------  IN: 220 mL / OUT: 0 mL / NET: 220 mL          Weight trend:  Weight (kg): 77.6 (04-19)    Physical exam:  General: No apparent distress  HEENT: Anicteric sclera. Moist mucous membranes.  Cardiac: Regular rate and rhythm. PAPITO LUSB, RUSB  Vascular: Symmetric radial pulses. Dorsalis pedis pulses palpable.   Respiratory: Normal effort. B/L clear lung sounds   Abdomen: Soft, nontender. Audible bowel sounds.   Extremities: Warm, No pitting edema. No cyanosis or clubbing.   Skin: Warm and dry. No rash.   Neurologic: Grossly normal motor function.   Psychiatric: Oriented to person, place, and time.     Data reviewed:  - Telemetry: SR 60-90's  - ECG (4/19/24):  Sinus tachycardia, LBBB  - TTE: < from: TTE Echo Complete w/o Contrast w/ Doppler (04.21.24 @ 01:28) >  Summary:   1. Left ventricular ejection fraction, by visual estimation, is 35 to   40%.   2. Moderately decreased global left ventricular systolic function.   3. Multiple left ventricular regional wall motion abnormalities exist.   See wall motion findings.   4. Spectral Doppler shows impaired relaxation pattern of left   ventricular myocardial filling (Grade I diastolic dysfunction).   5. Mildly enlarged left atrium.   6. Calcified aortic valve with mildly decreased opening.   7. Mild-to-moderate mitral regurgitation.   8. Mild tricuspid regurgitation.   9. Estimated pulmonary artery systolic pressure is 35.5 mmHg assuming a   right atrial pressure of 8 mmHg, which is consistent with borderline   pulmonary hypertension.  10. Trivial pericardial effusion.    < end of copied text >    - Chest x-ray (4/19/24): Enlarged cardiac silhouette, interstitial opacities, and trace effusions.    - Labs:                        11.1   5.72  )-----------( 318      ( 21 Apr 2024 05:18 )             34.5     04-21    145  |  101  |  28<H>  ----------------------------<  119<H>  3.7   |  31  |  1.3    Ca    9.2      21 Apr 2024 05:18  Mg     1.8     04-21    TPro  7.1  /  Alb  4.2  /  TBili  0.7  /  DBili  x   /  AST  34  /  ALT  66<H>  /  AlkPhos  84  04-19    LIVER FUNCTIONS - ( 19 Apr 2024 11:50 )  Alb: 4.2 g/dL / Pro: 7.1 g/dL / ALK PHOS: 84 U/L / ALT: 66 U/L / AST: 34 U/L / GGT: x               Lactate Trend    Urinalysis Basic - ( 21 Apr 2024 05:18 )    Color: x / Appearance: x / SG: x / pH: x  Gluc: 119 mg/dL / Ketone: x  / Bili: x / Urobili: x   Blood: x / Protein: x / Nitrite: x   Leuk Esterase: x / RBC: x / WBC x   Sq Epi: x / Non Sq Epi: x / Bacteria: x        Triglycerides, Serum: 76 mg/dL (04-20-24 @ 06:30)  LDL Cholesterol Calculated: 108 mg/dL (04-20-24 @ 06:30)    Thyroid Stimulating Hormone, Serum: 2.18 uIU/mL (04-20-24 @ 06:30)    Urinalysis Basic - ( 20 Apr 2024 06:30 )    Color: x / Appearance: x / SG: x / pH: x  Gluc: 119 mg/dL / Ketone: x  / Bili: x / Urobili: x   Blood: x / Protein: x / Nitrite: x   Leuk Esterase: x / RBC: x / WBC x   Sq Epi: x / Non Sq Epi: x / Bacteria: x    Medications:  MEDICATIONS  (STANDING):  carvedilol 6.25 milliGRAM(s) Oral every 12 hours  dapagliflozin 10 milliGRAM(s) Oral every 24 hours  dextrose 10% Bolus 125 milliLiter(s) IV Bolus once  dextrose 5%. 1000 milliLiter(s) (100 mL/Hr) IV Continuous <Continuous>  dextrose 5%. 1000 milliLiter(s) (50 mL/Hr) IV Continuous <Continuous>  dextrose 50% Injectable 12.5 Gram(s) IV Push once  dextrose 50% Injectable 25 Gram(s) IV Push once  enoxaparin Injectable 40 milliGRAM(s) SubCutaneous every 24 hours  glucagon  Injectable 1 milliGRAM(s) IntraMuscular once  influenza  Vaccine (HIGH DOSE) 0.7 milliLiter(s) IntraMuscular once  insulin lispro (ADMELOG) corrective regimen sliding scale   SubCutaneous three times a day before meals  insulin lispro (ADMELOG) corrective regimen sliding scale   SubCutaneous at bedtime  iron sucrose IVPB 200 milliGRAM(s) IV Intermittent every 24 hours  sacubitril 49 mG/valsartan 51 mG 1 Tablet(s) Oral two times a day    MEDICATIONS  (PRN):  dextrose Oral Gel 15 Gram(s) Oral once PRN Blood Glucose LESS THAN 70 milliGRAM(s)/deciliter      PRN:     Allergies    No Known Allergies    Intolerances

## 2024-04-23 NOTE — DISCHARGE NOTE PROVIDER - NSDCMRMEDTOKEN_GEN_ALL_CORE_FT
carvedilol 6.25 mg oral tablet: 1 tab(s) orally every 12 hours  dapagliflozin 10 mg oral tablet: 1 tab(s) orally every 24 hours  furosemide 40 mg oral tablet: 1 tab(s) orally once a day  metFORMIN 500 mg oral tablet, extended release: 1 tab(s) orally once a day with breakfast  sacubitril-valsartan 49 mg-51 mg oral tablet: 1 tab(s) orally 2 times a day  Vitamin D3 50 mcg: Take 1 capsule by mouth daily   carvedilol 6.25 mg oral tablet: 1 tab(s) orally every 12 hours  furosemide 40 mg oral tablet: 1 tab(s) orally once a day  Jardiance 10 mg oral tablet: 1 tab(s) orally once a day  metFORMIN 500 mg oral tablet, extended release: 1 tab(s) orally once a day with breakfast  sacubitril-valsartan 49 mg-51 mg oral tablet: 1 tab(s) orally 2 times a day  Vitamin D3 50 mcg: Take 1 capsule by mouth daily

## 2024-04-23 NOTE — DISCHARGE NOTE PROVIDER - HOSPITAL COURSE
79 yo female with PMHx of HTN, DM, LEIGH (not on CPap) who presented to [facility] ED w/ complaints of SOB worsening over past couple of days, found to be volume overloaded, and  admitted to cardiac telemetry for further management. Found to have EF 37% (Dec '23), still with HFrEF now EF 35 to 40 % on 4/21/2024 ECHO.  On hospital stay EKG report: LBBB. .Patient Outpatient records reviewed with CCTA from 01/2024 showing CAC 59 with LM normal, mLAD minimal, dLAD minimal, mLCx minimal, pRCA minimal.  Non-ischemic cardiomyopathy started on GDMT was consulted with EP and recommendation for cardiac MRI and Discussed the options of BiV-ICD vs BiV-PPM as possibilities of treatments.   Patient will be discharge on carvedilol, Entresto, dapagliflozin and lasix.  Patient will follow up with EP outpatient and Dr Jensen.  Patient stable to be discharge.

## 2024-04-30 DIAGNOSIS — G47.33 OBSTRUCTIVE SLEEP APNEA (ADULT) (PEDIATRIC): ICD-10-CM

## 2024-04-30 DIAGNOSIS — Z79.84 LONG TERM (CURRENT) USE OF ORAL HYPOGLYCEMIC DRUGS: ICD-10-CM

## 2024-04-30 DIAGNOSIS — I11.0 HYPERTENSIVE HEART DISEASE WITH HEART FAILURE: ICD-10-CM

## 2024-04-30 DIAGNOSIS — I50.23 ACUTE ON CHRONIC SYSTOLIC (CONGESTIVE) HEART FAILURE: ICD-10-CM

## 2024-04-30 DIAGNOSIS — I27.20 PULMONARY HYPERTENSION, UNSPECIFIED: ICD-10-CM

## 2024-04-30 DIAGNOSIS — I44.7 LEFT BUNDLE-BRANCH BLOCK, UNSPECIFIED: ICD-10-CM

## 2024-04-30 DIAGNOSIS — E11.9 TYPE 2 DIABETES MELLITUS WITHOUT COMPLICATIONS: ICD-10-CM

## 2024-04-30 DIAGNOSIS — J96.01 ACUTE RESPIRATORY FAILURE WITH HYPOXIA: ICD-10-CM

## 2024-04-30 DIAGNOSIS — R06.02 SHORTNESS OF BREATH: ICD-10-CM

## 2024-04-30 DIAGNOSIS — I42.8 OTHER CARDIOMYOPATHIES: ICD-10-CM

## 2024-04-30 DIAGNOSIS — I08.3 COMBINED RHEUMATIC DISORDERS OF MITRAL, AORTIC AND TRICUSPID VALVES: ICD-10-CM

## 2024-05-15 ENCOUNTER — APPOINTMENT (OUTPATIENT)
Dept: CARDIOLOGY | Facility: CLINIC | Age: 79
End: 2024-05-15
Payer: MEDICARE

## 2024-05-15 ENCOUNTER — RESULT CHARGE (OUTPATIENT)
Age: 79
End: 2024-05-15

## 2024-05-15 VITALS — SYSTOLIC BLOOD PRESSURE: 128 MMHG | DIASTOLIC BLOOD PRESSURE: 80 MMHG

## 2024-05-15 VITALS — WEIGHT: 165 LBS | BODY MASS INDEX: 28.17 KG/M2 | HEIGHT: 64 IN

## 2024-05-15 VITALS — SYSTOLIC BLOOD PRESSURE: 140 MMHG | HEART RATE: 92 BPM | DIASTOLIC BLOOD PRESSURE: 80 MMHG

## 2024-05-15 DIAGNOSIS — Z78.9 OTHER SPECIFIED HEALTH STATUS: ICD-10-CM

## 2024-05-15 PROCEDURE — 99214 OFFICE O/P EST MOD 30 MIN: CPT

## 2024-05-15 PROCEDURE — 93000 ELECTROCARDIOGRAM COMPLETE: CPT

## 2024-05-15 PROCEDURE — 99204 OFFICE O/P NEW MOD 45 MIN: CPT

## 2024-05-15 NOTE — REASON FOR VISIT
[Other: ____] : [unfilled] [FreeTextEntry1] : Diagnostic Tests: ----------------------- EK/15/24-NSR. LBBB. PRWP.  24-Sinus tachycardia at 103 bpm. LBBB. Anterior infarct. RAD.  ---------------------- Echo:  24-TTE: EF 35-40%. Basal-mid Anteroseptum inferoseptum and inferior wall akinetic. [place], mid-apical anterior and mid inferolateral hypokinetic. Grade I DD. Mild LAE. Aortic sclerosis. Mild-mod MR. Mild TR. PASP 35.5 mmHg. Trace pericardial effusion.  ---------------------- CT:  2024-CCTA: CAC 59, LM nomral, LAD minimal, LCx minimal, pRCA minimal.

## 2024-05-15 NOTE — ASSESSMENT
[FreeTextEntry1] : HFrEF: NICM. No obstructive CAD seen on CCTA. EF 35-40%.  -Pending MRI to assess LVEF and infiltrative disease.  -If EF <35% or possible infiltrative disease, EP eval for ICD.  -Continue with coreg 6.25mg PO BID, entresto 49/51mg PO BID, Jardiance 10mg PO daily and lasix 40mg PO daily.  -Instructed pt to weigh self daily.  -If gaining 1-2 lbs in 1 day, can take extra lasix.   DM:  -Continue with metformin and Jardiance.   Follow up in 6 weeks.  -Check labs.

## 2024-05-15 NOTE — HISTORY OF PRESENT ILLNESS
[FreeTextEntry1] : Ms. Vasquez is a 79yo F with PMHx of HTN, HFrEF, DM, LEIGH who presents to [facility] care. Her PMD is Dr. Malinda Mendosa. She recently was treated at [facility] for acute decompensated HFrEF in April 2024. She was diuresed and had outpatient records showing NICM. Pending MRI for further work up. She is feeling well overall. No new complaints.

## 2024-06-10 ENCOUNTER — APPOINTMENT (OUTPATIENT)
Dept: CARDIOLOGY | Facility: CLINIC | Age: 79
End: 2024-06-10
Payer: MEDICARE

## 2024-06-10 VITALS — DIASTOLIC BLOOD PRESSURE: 88 MMHG | SYSTOLIC BLOOD PRESSURE: 142 MMHG

## 2024-06-10 VITALS
HEIGHT: 64 IN | WEIGHT: 166 LBS | OXYGEN SATURATION: 96 % | HEART RATE: 93 BPM | SYSTOLIC BLOOD PRESSURE: 140 MMHG | BODY MASS INDEX: 28.34 KG/M2 | DIASTOLIC BLOOD PRESSURE: 96 MMHG

## 2024-06-10 DIAGNOSIS — G47.33 OBSTRUCTIVE SLEEP APNEA (ADULT) (PEDIATRIC): ICD-10-CM

## 2024-06-10 DIAGNOSIS — E11.9 TYPE 2 DIABETES MELLITUS W/OUT COMPLICATIONS: ICD-10-CM

## 2024-06-10 DIAGNOSIS — I50.20 UNSPECIFIED SYSTOLIC (CONGESTIVE) HEART FAILURE: ICD-10-CM

## 2024-06-10 DIAGNOSIS — I10 ESSENTIAL (PRIMARY) HYPERTENSION: ICD-10-CM

## 2024-06-10 PROCEDURE — G2211 COMPLEX E/M VISIT ADD ON: CPT

## 2024-06-10 PROCEDURE — 99214 OFFICE O/P EST MOD 30 MIN: CPT

## 2024-06-10 RX ORDER — EMPAGLIFLOZIN 10 MG/1
10 TABLET, FILM COATED ORAL DAILY
Qty: 90 | Refills: 3 | Status: ACTIVE | COMMUNITY
Start: 1900-01-01 | End: 1900-01-01

## 2024-06-10 RX ORDER — FUROSEMIDE 40 MG/1
40 TABLET ORAL DAILY
Qty: 90 | Refills: 3 | Status: ACTIVE | COMMUNITY
Start: 1900-01-01 | End: 1900-01-01

## 2024-06-10 RX ORDER — CARVEDILOL 6.25 MG/1
6.25 TABLET, FILM COATED ORAL TWICE DAILY
Qty: 180 | Refills: 3 | Status: ACTIVE | COMMUNITY
Start: 1900-01-01 | End: 1900-01-01

## 2024-06-10 RX ORDER — SACUBITRIL AND VALSARTAN 49; 51 MG/1; MG/1
49-51 TABLET, FILM COATED ORAL
Qty: 180 | Refills: 3 | Status: ACTIVE | COMMUNITY
Start: 1900-01-01 | End: 1900-01-01

## 2024-06-10 RX ORDER — METFORMIN HYDROCHLORIDE 500 MG/1
500 TABLET, COATED ORAL DAILY
Qty: 90 | Refills: 3 | Status: ACTIVE | COMMUNITY
Start: 1900-01-01 | End: 1900-01-01

## 2024-06-10 NOTE — REASON FOR VISIT
[Other: ____] : [unfilled] [FreeTextEntry1] : Diagnostic Tests: ----------------------- EK/15/24-NSR. LBBB. PRWP.  24-Sinus tachycardia at 103 bpm. LBBB. Anterior infarct. RAD.  ---------------------- Echo:  24-TTE: EF 35-40%. Basal-mid Anteroseptum inferoseptum and inferior wall akinetic. [place], mid-apical anterior and mid inferolateral hypokinetic. Grade I DD. Mild LAE. Aortic sclerosis. Mild-mod MR. Mild TR. PASP 35.5 mmHg. Trace pericardial effusion.  ---------------------- CT:  2024-CCTA: CAC 59, LM nomral, LAD minimal, LCx minimal, pRCA minimal.  --------------------- MRI:  24-CMR: LVEF 28%. No LGE. RVEF 39%. Mild LAE. Mild MR.

## 2024-06-10 NOTE — ASSESSMENT
[FreeTextEntry1] : HFrEF: NICM. No obstructive CAD seen on CCTA. EF 35-40%.  -CMR showing EF 28%.  -EP follow up for possible ICD. Later this month.  -If EF <35% or possible infiltrative disease, EP eval for ICD.  -Continue with coreg 6.25mg PO BID, entresto 49/51mg PO BID, Jardiance 10mg PO daily and lasix 40mg PO daily.  -Instructed pt to weigh self daily.  -If gaining 1-2 lbs in 1 day, can take extra lasix.  -Pt off meds for 3 days. Signs of mild volume overload. They do not want to go to ED.  -Restart meds. Will follow up in 2-3 days.  -If still symptomatic, will need to discuss hospitalization for further diuresis.   DM:  -Continue with metformin and Jardiance.  -Discussed statins.  -Pt's daughter will bring literature to review regarding cholesterol.  -Defer statins at this time.   Follow up in 4-6 weeks.

## 2024-06-10 NOTE — PHYSICAL EXAM
[Well Developed] : well developed [Well Nourished] : well nourished [No Acute Distress] : no acute distress [Normal Conjunctiva] : normal conjunctiva [Normal Venous Pressure] : normal venous pressure [No Carotid Bruit] : no carotid bruit [5th Left ICS - MCL] : palpated at the 5th LICS in the midclavicular line [Normal] : normal [No Precordial Heave] : no precordial heave was noted [Normal Rate] : normal [Rhythm Regular] : regular [Normal S1] : normal S1 [Normal S2] : normal S2 [No Murmur] : no murmurs heard [2+] : left 2+ [Good Air Entry] : good air entry [No Respiratory Distress] : no respiratory distress  [Soft] : abdomen soft [Non Tender] : non-tender [No Masses/organomegaly] : no masses/organomegaly [Normal Bowel Sounds] : normal bowel sounds [Normal Gait] : normal gait [No Edema] : no edema [No Cyanosis] : no cyanosis [No Clubbing] : no clubbing [No Varicosities] : no varicosities [No Rash] : no rash [No Skin Lesions] : no skin lesions [Moves all extremities] : moves all extremities [No Focal Deficits] : no focal deficits [Normal Speech] : normal speech [Alert and Oriented] : alert and oriented [Normal memory] : normal memory [___ +] : bilateral [unfilled]U+ pitting edema to the ankles [Vivek ___] : vivek HaleV

## 2024-06-10 NOTE — REVIEW OF SYSTEMS
[Negative] : Heme/Lymph [SOB] : shortness of breath [Dyspnea on exertion] : dyspnea during exertion [Palpitations] : no palpitations

## 2024-06-10 NOTE — HISTORY OF PRESENT ILLNESS
[FreeTextEntry1] : Ms. Vasquez is a 77yo F with PMHx of HTN, HFrEF, DM, LEIGH who presents to [facility] care. Her PMD is Dr. Malinda Mendosa. She recently was treated at [facility] for acute decompensated HFrEF in April 2024. She was diuresed and had outpatient records showing NICM. Pending MRI for further work up. She is feeling well overall. No new complaints.  06/10/24-Patient ran out of meds. She is feeling somewhat SOB with ambulation. Discussed MRI results and EP follow up.

## 2024-06-11 ENCOUNTER — APPOINTMENT (OUTPATIENT)
Dept: CARDIOLOGY | Facility: CLINIC | Age: 79
End: 2024-06-11

## 2024-06-26 ENCOUNTER — APPOINTMENT (OUTPATIENT)
Dept: ELECTROPHYSIOLOGY | Facility: CLINIC | Age: 79
End: 2024-06-26

## 2024-07-08 ENCOUNTER — APPOINTMENT (OUTPATIENT)
Dept: CARDIOLOGY | Facility: CLINIC | Age: 79
End: 2024-07-08
